# Patient Record
Sex: FEMALE | Race: WHITE | Employment: FULL TIME
[De-identification: names, ages, dates, MRNs, and addresses within clinical notes are randomized per-mention and may not be internally consistent; named-entity substitution may affect disease eponyms.]

---

## 2017-01-10 ENCOUNTER — OFFICE VISIT (OUTPATIENT)
Dept: OBGYN | Facility: CLINIC | Age: 27
End: 2017-01-10

## 2017-01-10 VITALS
HEIGHT: 68 IN | SYSTOLIC BLOOD PRESSURE: 128 MMHG | WEIGHT: 293 LBS | BODY MASS INDEX: 44.41 KG/M2 | DIASTOLIC BLOOD PRESSURE: 80 MMHG

## 2017-01-10 DIAGNOSIS — Z01.419 ENCOUNTER FOR WELL WOMAN EXAM WITH ROUTINE GYNECOLOGICAL EXAM: Primary | ICD-10-CM

## 2017-01-10 DIAGNOSIS — N92.6 IRREGULAR MENSTRUAL CYCLE: ICD-10-CM

## 2017-01-10 PROCEDURE — 99395 PREV VISIT EST AGE 18-39: CPT | Performed by: OBSTETRICS & GYNECOLOGY

## 2017-01-10 RX ORDER — NORGESTIMATE AND ETHINYL ESTRADIOL 0.25-0.035
1 KIT ORAL DAILY
Qty: 28 TABLET | Refills: 12 | Status: SHIPPED | OUTPATIENT
Start: 2017-01-10 | End: 2018-01-22 | Stop reason: SDUPTHER

## 2017-09-26 ENCOUNTER — HOSPITAL ENCOUNTER (OUTPATIENT)
Dept: SLEEP CENTER | Age: 27
Discharge: HOME | End: 2017-09-26
Payer: COMMERCIAL

## 2017-09-26 PROCEDURE — 95811 POLYSOM 6/>YRS CPAP 4/> PARM: CPT

## 2017-09-26 ASSESSMENT — SLEEP AND FATIGUE QUESTIONNAIRES
HOW LIKELY ARE YOU TO NOD OFF OR FALL ASLEEP IN A CAR, WHILE STOPPED FOR A FEW MINUTES IN TRAFFIC: 0
ESS TOTAL SCORE: 9
NECK CIRCUMFERENCE (INCHES): 20.5
HOW LIKELY ARE YOU TO NOD OFF OR FALL ASLEEP WHILE WATCHING TV: 2
HOW LIKELY ARE YOU TO NOD OFF OR FALL ASLEEP WHEN YOU ARE A PASSENGER IN A CAR FOR AN HOUR WITHOUT A BREAK: 1
HOW LIKELY ARE YOU TO NOD OFF OR FALL ASLEEP WHILE SITTING INACTIVE IN A PUBLIC PLACE: 0
HOW LIKELY ARE YOU TO NOD OFF OR FALL ASLEEP WHILE LYING DOWN TO REST IN THE AFTERNOON WHEN CIRCUMSTANCES PERMIT: 1
HOW LIKELY ARE YOU TO NOD OFF OR FALL ASLEEP WHILE SITTING AND READING: 1
HOW LIKELY ARE YOU TO NOD OFF OR FALL ASLEEP WHILE SITTING AND TALKING TO SOMEONE: 2
HOW LIKELY ARE YOU TO NOD OFF OR FALL ASLEEP WHILE SITTING QUIETLY AFTER LUNCH WITHOUT ALCOHOL: 2

## 2018-01-22 DIAGNOSIS — N92.6 IRREGULAR MENSTRUAL CYCLE: ICD-10-CM

## 2018-01-22 RX ORDER — NORGESTIMATE AND ETHINYL ESTRADIOL 0.25-0.035
1 KIT ORAL DAILY
Qty: 28 TABLET | Refills: 0 | Status: SHIPPED | OUTPATIENT
Start: 2018-01-22 | End: 2018-02-14 | Stop reason: SDUPTHER

## 2018-02-14 ENCOUNTER — OFFICE VISIT (OUTPATIENT)
Dept: OBGYN | Age: 28
End: 2018-02-14
Payer: COMMERCIAL

## 2018-02-14 ENCOUNTER — HOSPITAL ENCOUNTER (OUTPATIENT)
Age: 28
Setting detail: SPECIMEN
Discharge: HOME | End: 2018-02-14
Payer: COMMERCIAL

## 2018-02-14 VITALS
HEIGHT: 68 IN | BODY MASS INDEX: 44.41 KG/M2 | DIASTOLIC BLOOD PRESSURE: 70 MMHG | WEIGHT: 293 LBS | SYSTOLIC BLOOD PRESSURE: 138 MMHG

## 2018-02-14 DIAGNOSIS — B35.6 TINEA CRURIS: ICD-10-CM

## 2018-02-14 DIAGNOSIS — Z01.419 WOMEN'S ANNUAL ROUTINE GYNECOLOGICAL EXAMINATION: Primary | ICD-10-CM

## 2018-02-14 DIAGNOSIS — N92.6 IRREGULAR MENSTRUAL CYCLE: ICD-10-CM

## 2018-02-14 DIAGNOSIS — Z01.419 WOMEN'S ANNUAL ROUTINE GYNECOLOGICAL EXAMINATION: ICD-10-CM

## 2018-02-14 PROCEDURE — G0145 SCR C/V CYTO,THINLAYER,RESCR: HCPCS

## 2018-02-14 PROCEDURE — 99395 PREV VISIT EST AGE 18-39: CPT | Performed by: OBSTETRICS & GYNECOLOGY

## 2018-02-14 RX ORDER — NYSTATIN 100000 [USP'U]/G
POWDER TOPICAL
Qty: 45 G | Refills: 3 | Status: ON HOLD | OUTPATIENT
Start: 2018-02-14 | End: 2021-10-02

## 2018-02-14 RX ORDER — NORGESTIMATE AND ETHINYL ESTRADIOL 0.25-0.035
1 KIT ORAL DAILY
Qty: 28 TABLET | Refills: 12 | Status: SHIPPED | OUTPATIENT
Start: 2018-02-14 | End: 2019-02-11 | Stop reason: SDUPTHER

## 2018-02-14 NOTE — PROGRESS NOTES
capsules by mouth daily, Disp: 60 capsule, Rfl: 0    History   Sexual Activity    Sexual activity: Yes    Partners: Male       Any bleeding or pain with intercourse: No    Last Yearly:  1-    Last pap: 1-    Last HPV: never    Last Mammogram: never    Last Dexascan never    Do you do self breast exams: Yes    Past Medical History:   Diagnosis Date    Anemia 2005    Depression 2016    GERD (gastroesophageal reflux disease)     Herpes simplex type 2 (HSV-2) infection affecting pregnancy, antepartum     Hypertension 2016    Hypothyroidism 1995    MVP (mitral valve prolapse)        Past Surgical History:   Procedure Laterality Date    WISDOM TOOTH EXTRACTION         Family History   Problem Relation Age of Onset    High Blood Pressure Father     Heart Disease Father     Emphysema Father     COPD Father     Elevated Lipids Father     Atrial Fibrillation Father     Diabetes Maternal Grandfather     Heart Disease Paternal Grandfather     Diabetes Paternal Grandfather     High Blood Pressure Paternal Grandfather     Emphysema Paternal Grandfather     COPD Paternal Grandfather     High Blood Pressure Brother     Heart Disease Paternal Grandmother     Thyroid Disease Mother        Any family history of breast or ovarian cancer: No    Any family history of blood clots: No      Chief Complaint   Patient presents with    Gynecologic Exam          Nurse: MARC    PE:  Vital Signs  Blood pressure 138/70, height 5' 8\" (1.727 m), weight (!) 500 lb (226.8 kg), last menstrual period 01/22/2018, not currently breastfeeding. Labs:    No results found for this visit on 02/14/18. HPI: The patient is here today for a yearly exam.  She does request a refill of her birth control pills as well.     NoPT denies fever, chills, nausea and vomiting       Objective  Lymphatic:   no lymphadenopathy  Heent:   negative   Cor: regular rate and rhythm, no murmurs              Pul:clear to auscultation bilaterally- no wheezes, rales or rhonchi, normal air movement, no respiratory distress      GI: Abdomen soft, non-tender. BS normal. No masses,  No organomegaly, morbid obesity noted           Extremities: normal strength, tone, and muscle mass   Breasts: Breast:normal appearance, no masses or tenderness   Pelvic Exam: GENITAL/URINARY:  External Genitalia:  General appearance; normal, Hair distribution; normal, Lesions absent  Vagina:  General appearance normal, Estrogen effect normal, Discharge absent, Lesions absent, Pelvic support normal  Cervix:  General appearance normal, Lesions absent, Discharge absent, Tenderness absent, Enlargement absent, Nodularity absent, poorly visualized  Uterus:  Masses absent, Consistency; normal, Support normal, Tenderness absent, suboptimal exam  Adenexa:  Unable to palpate suboptimal exam                                    Vaginal discharge: no vaginal discharge   Skin: Patient does have tinea cruris in her abdominal folds. She was also counseled on her preventative health maintenance recommendations and follow-up. Assessment and Plan: I did talk to the patient about bariatric surgery. She is getting more interesting in proceeding with this. 1. Women's annual routine gynecological examination  PAP SMEAR   2. Irregular menstrual cycle  norgestimate-ethinyl estradiol (SPRINTEC 28) 0.25-35 MG-MCG per tablet   3. Tinea cruris  nystatin (MYCOSTATIN) 606632 UNIT/GM powder             I am having Ms. Razo start on nystatin. I am also having her maintain her Esomeprazole Magnesium (NEXIUM PO), atorvastatin, Vitamin D3, therapeutic multivitamin-minerals, losartan-hydrochlorothiazide, and norgestimate-ethinyl estradiol. Return in about 1 year (around 2/14/2019). There are no Patient Instructions on file for this visit.        Trung Engle,2/14/2018 3:23 PM

## 2018-02-22 LAB — CYTOLOGY REPORT: NORMAL

## 2018-08-04 ENCOUNTER — HOSPITAL ENCOUNTER (EMERGENCY)
Age: 28
Discharge: HOME | End: 2018-08-04
Payer: COMMERCIAL

## 2018-08-04 ENCOUNTER — APPOINTMENT (OUTPATIENT)
Dept: GENERAL RADIOLOGY | Age: 28
End: 2018-08-04
Payer: COMMERCIAL

## 2018-08-04 ENCOUNTER — APPOINTMENT (OUTPATIENT)
Dept: VASCULAR LAB | Age: 28
End: 2018-08-04
Payer: COMMERCIAL

## 2018-08-04 VITALS
HEART RATE: 96 BPM | OXYGEN SATURATION: 98 % | TEMPERATURE: 98.4 F | DIASTOLIC BLOOD PRESSURE: 86 MMHG | SYSTOLIC BLOOD PRESSURE: 154 MMHG | RESPIRATION RATE: 20 BRPM

## 2018-08-04 DIAGNOSIS — L03.116 CELLULITIS OF LEFT LOWER EXTREMITY: Primary | ICD-10-CM

## 2018-08-04 PROCEDURE — 93971 EXTREMITY STUDY: CPT

## 2018-08-04 PROCEDURE — 99283 EMERGENCY DEPT VISIT LOW MDM: CPT

## 2018-08-04 RX ORDER — GABAPENTIN 300 MG/1
300 CAPSULE ORAL ONCE
Status: DISCONTINUED | OUTPATIENT
Start: 2018-08-04 | End: 2018-08-04

## 2018-08-04 RX ORDER — IBUPROFEN 600 MG/1
600 TABLET ORAL EVERY 6 HOURS PRN
Qty: 30 TABLET | Refills: 0 | Status: ON HOLD | OUTPATIENT
Start: 2018-08-04 | End: 2021-10-02

## 2018-08-04 RX ORDER — DEXAMETHASONE SODIUM PHOSPHATE 10 MG/ML
10 INJECTION, SOLUTION INTRAMUSCULAR; INTRAVENOUS ONCE
Status: DISCONTINUED | OUTPATIENT
Start: 2018-08-04 | End: 2018-08-04

## 2018-08-04 RX ORDER — DOXYCYCLINE HYCLATE 100 MG
100 TABLET ORAL 2 TIMES DAILY
Qty: 20 TABLET | Refills: 0 | Status: SHIPPED | OUTPATIENT
Start: 2018-08-04 | End: 2018-08-14

## 2018-08-04 RX ORDER — OXYCODONE HYDROCHLORIDE AND ACETAMINOPHEN 5; 325 MG/1; MG/1
1 TABLET ORAL ONCE
Status: DISCONTINUED | OUTPATIENT
Start: 2018-08-04 | End: 2018-08-04

## 2018-08-04 RX ORDER — ESOMEPRAZOLE MAGNESIUM 40 MG/1
40 FOR SUSPENSION ORAL DAILY
Status: ON HOLD | COMMUNITY
End: 2021-10-02

## 2018-08-04 ASSESSMENT — PAIN DESCRIPTION - LOCATION: LOCATION: LEG

## 2018-08-04 ASSESSMENT — PAIN SCALES - GENERAL: PAINLEVEL_OUTOF10: 3

## 2018-08-04 ASSESSMENT — ENCOUNTER SYMPTOMS: SHORTNESS OF BREATH: 0

## 2018-08-04 ASSESSMENT — PAIN DESCRIPTION - PAIN TYPE: TYPE: ACUTE PAIN

## 2018-08-04 ASSESSMENT — PAIN DESCRIPTION - ORIENTATION: ORIENTATION: LEFT

## 2018-08-04 ASSESSMENT — PAIN DESCRIPTION - DESCRIPTORS: DESCRIPTORS: SHARP

## 2018-08-04 NOTE — ED PROVIDER NOTES
[facility] ED  eMERGENCY dEPARTMENT eNCOUnter      Pt Name: Liz Black  MRN: [id number]  [place] 1990  Date of evaluation: 8/4/2018  Provider: Cheo Dukes PA-C,    [address] [place] [address]       Chief Complaint   Patient presents with    Leg Pain     left leg touching air blowing on it hurts. red in color       HISTORY OF PRESENT ILLNESS    Liz Black is a 33 y.o. female who presents to the emergency department from home complains of pain anterior lower left leg for the past week or 2 and increased over the past several days. He states is painful to palpation. Patient states that she does of her leg a lot of work. She denies any shortness of breath any chest pain numbness paresthesias decreased range of motion or any other complaints at present. Triage notes and Nursing notes were reviewed by myself. Any discrepancies are addressed above. PAST MEDICAL HISTORY     Past Medical History:   Diagnosis Date    Anemia 2005    Depression 2016    GERD (gastroesophageal reflux disease)     Herpes simplex type 2 (HSV-2) infection affecting pregnancy, antepartum     Hyperlipidemia     Hypertension 2016    Hypothyroidism 1995    MVP (mitral valve prolapse)        SURGICAL HISTORY       Past Surgical History:   Procedure Laterality Date    WISDOM TOOTH EXTRACTION         CURRENT MEDICATIONS       Previous Medications    ATORVASTATIN (LIPITOR) 20 MG TABLET    Take 1 tablet by mouth daily    CHOLECALCIFEROL (VITAMIN D3) 1000 UNITS CAPS    Take 2 capsules by mouth daily    ESOMEPRAZOLE MAGNESIUM (NEXIUM) 40 MG PACK    Take 40 mg by mouth daily    IBUPROFEN (IBU) 800 MG TABLET    Take 1 tablet by mouth every 8 hours as needed for Pain for 10 days.     LOSARTAN-HYDROCHLOROTHIAZIDE (HYZAAR) 50-12.5 MG PER TABLET    Take 1 tablet by mouth daily    MULTIPLE VITAMINS-MINERALS (THERAPEUTIC MULTIVITAMIN-MINERALS) TABLET    Take 1 tablet by mouth daily    NORGESTIMATE-ETHINYL ESTRADIOL (SPRINTEC 28) 0.25-35 MG-MCG PER TABLET    Take 1 tablet by mouth daily    NYSTATIN (MYCOSTATIN) 184036 UNIT/GM POWDER    Apply 3 times daily as needed for skin infection       ALLERGIES     Penicillins and Tape [adhesive tape]    FAMILY HISTORY       Family History   Problem Relation Age of Onset    High Blood Pressure Father     Heart Disease Father     Emphysema Father     COPD Father     Elevated Lipids Father     Atrial Fibrillation Father     Diabetes Maternal Grandfather     Heart Disease Paternal Grandfather     Diabetes Paternal Grandfather     High Blood Pressure Paternal Grandfather     Emphysema Paternal Grandfather     COPD Paternal Grandfather     High Blood Pressure Brother     Heart Disease Paternal Grandmother     Thyroid Disease Mother         SOCIAL HISTORY       Social History     Social History    Marital status:      Spouse name: N/A    Number of children: N/A    Years of education: N/A     Social History Main Topics    Smoking status: Former Smoker     Types: Cigarettes     Quit date: 9/14/2014    Smokeless tobacco: Never Used    Alcohol use Yes      Comment: rare    Drug use: No    Sexual activity: Yes     Partners: Male     Other Topics Concern    None     Social History Narrative    None       REVIEW OF SYSTEMS       Review of Systems   Respiratory: Negative for shortness of breath. Musculoskeletal: Positive for myalgias. Neurological: Negative for numbness. All other systems reviewed and are negative. Review of systems as in HPI & PMH otherwise negative    PHYSICAL EXAM    (up to 7 for level 4, 8 or more for level 5)     ED Triage Vitals [08/04/18 1042]   BP Temp Temp Source Pulse Resp SpO2 Height Weight   -- 98.4 °F (36.9 °C) Tympanic 115 20 98 % -- --       Physical Exam   Constitutional: She is oriented to person, place, and time. She appears well-developed and well-nourished. HENT:   Head: Normocephalic and atraumatic.    Eyes: Conjunctivae are normal.

## 2018-08-04 NOTE — LETTER
[facility] ED  [address] [address]  Phone: [phone]  Fax: [phone]             August 4, 2018    Patient: Jimena Varela   YOB: 1990   Date of Visit: 8/4/2018       To Whom It May Concern:    Stu Luna was seen and treated in our emergency department on 8/4/2018. She may return to work on 8/6/2018.       Sincerely,             Signature:__________________________________

## 2019-02-11 DIAGNOSIS — N92.6 IRREGULAR MENSTRUAL CYCLE: ICD-10-CM

## 2019-02-11 RX ORDER — NORGESTIMATE AND ETHINYL ESTRADIOL 0.25-0.035
1 KIT ORAL DAILY
Qty: 28 TABLET | Refills: 0 | Status: ON HOLD | OUTPATIENT
Start: 2019-02-11 | End: 2021-10-02

## 2020-01-12 ENCOUNTER — HOSPITAL ENCOUNTER (EMERGENCY)
Age: 30
Discharge: HOME | End: 2020-01-12
Attending: EMERGENCY MEDICINE

## 2020-01-12 ENCOUNTER — APPOINTMENT (OUTPATIENT)
Dept: CT IMAGING | Age: 30
End: 2020-01-12

## 2020-01-12 ENCOUNTER — APPOINTMENT (OUTPATIENT)
Dept: GENERAL RADIOLOGY | Age: 30
End: 2020-01-12

## 2020-01-12 VITALS
TEMPERATURE: 96.5 F | HEIGHT: 68 IN | RESPIRATION RATE: 20 BRPM | BODY MASS INDEX: 44.41 KG/M2 | OXYGEN SATURATION: 98 % | SYSTOLIC BLOOD PRESSURE: 147 MMHG | WEIGHT: 293 LBS | HEART RATE: 100 BPM | DIASTOLIC BLOOD PRESSURE: 99 MMHG

## 2020-01-12 LAB
-: ABNORMAL
ABSOLUTE EOS #: 0.26 K/UL (ref 0–0.44)
ABSOLUTE IMMATURE GRANULOCYTE: 0.05 K/UL (ref 0–0.3)
ABSOLUTE LYMPH #: 2.11 K/UL (ref 1.1–3.7)
ABSOLUTE MONO #: 0.52 K/UL (ref 0.1–1.2)
ALBUMIN SERPL-MCNC: 4.1 G/DL (ref 3.5–5.2)
ALBUMIN/GLOBULIN RATIO: 1.1 (ref 1–2.5)
ALP BLD-CCNC: 89 U/L (ref 35–104)
ALT SERPL-CCNC: 13 U/L (ref 5–33)
AMORPHOUS: ABNORMAL
ANION GAP SERPL CALCULATED.3IONS-SCNC: 17 MMOL/L (ref 9–17)
AST SERPL-CCNC: 20 U/L
BACTERIA: ABNORMAL
BASOPHILS # BLD: 1 % (ref 0–2)
BASOPHILS ABSOLUTE: 0.07 K/UL (ref 0–0.2)
BILIRUB SERPL-MCNC: 0.24 MG/DL (ref 0.3–1.2)
BILIRUBIN URINE: NEGATIVE
BUN BLDV-MCNC: 12 MG/DL (ref 6–20)
BUN/CREAT BLD: 16 (ref 9–20)
CALCIUM SERPL-MCNC: 9.4 MG/DL (ref 8.6–10.4)
CASTS UA: ABNORMAL /LPF
CHLORIDE BLD-SCNC: 99 MMOL/L (ref 98–107)
CO2: 22 MMOL/L (ref 20–31)
COLOR: YELLOW
COMMENT UA: ABNORMAL
CREAT SERPL-MCNC: 0.75 MG/DL (ref 0.5–0.9)
CRYSTALS, UA: ABNORMAL /HPF
DIFFERENTIAL TYPE: ABNORMAL
EOSINOPHILS RELATIVE PERCENT: 2 % (ref 1–4)
EPITHELIAL CELLS UA: ABNORMAL /HPF (ref 0–25)
GFR AFRICAN AMERICAN: >60 ML/MIN
GFR NON-AFRICAN AMERICAN: >60 ML/MIN
GFR SERPL CREATININE-BSD FRML MDRD: ABNORMAL ML/MIN/{1.73_M2}
GFR SERPL CREATININE-BSD FRML MDRD: ABNORMAL ML/MIN/{1.73_M2}
GLUCOSE BLD-MCNC: 119 MG/DL (ref 70–99)
GLUCOSE URINE: NEGATIVE
HCG QUALITATIVE: NEGATIVE
HCT VFR BLD CALC: 38.7 % (ref 36.3–47.1)
HEMOGLOBIN: 11.7 G/DL (ref 11.9–15.1)
IMMATURE GRANULOCYTES: 0 %
KETONES, URINE: NEGATIVE
LACTIC ACID, SEPSIS WHOLE BLOOD: NORMAL MMOL/L (ref 0.5–1.9)
LACTIC ACID, SEPSIS: 1.7 MMOL/L (ref 0.5–1.9)
LEUKOCYTE ESTERASE, URINE: NEGATIVE
LYMPHOCYTES # BLD: 16 % (ref 24–43)
MCH RBC QN AUTO: 25.9 PG (ref 25.2–33.5)
MCHC RBC AUTO-ENTMCNC: 30.2 G/DL (ref 28.4–34.8)
MCV RBC AUTO: 85.6 FL (ref 82.6–102.9)
MONOCYTES # BLD: 4 % (ref 3–12)
MUCUS: ABNORMAL
NITRITE, URINE: NEGATIVE
NRBC AUTOMATED: 0 PER 100 WBC
OTHER OBSERVATIONS UA: ABNORMAL
PARTIAL THROMBOPLASTIN TIME: 29.4 SEC (ref 23.2–34.4)
PDW BLD-RTO: 14.4 % (ref 11.8–14.4)
PH UA: 5.5 (ref 5–9)
PLATELET # BLD: 373 K/UL (ref 138–453)
PLATELET ESTIMATE: ABNORMAL
PMV BLD AUTO: 10.3 FL (ref 8.1–13.5)
POTASSIUM SERPL-SCNC: 4.1 MMOL/L (ref 3.7–5.3)
PROTEIN UA: ABNORMAL
RBC # BLD: 4.52 M/UL (ref 3.95–5.11)
RBC # BLD: ABNORMAL 10*6/UL
RBC UA: ABNORMAL /HPF (ref 0–2)
RENAL EPITHELIAL, UA: ABNORMAL /HPF
SEG NEUTROPHILS: 77 % (ref 36–65)
SEGMENTED NEUTROPHILS ABSOLUTE COUNT: 10.08 K/UL (ref 1.5–8.1)
SODIUM BLD-SCNC: 138 MMOL/L (ref 135–144)
SPECIFIC GRAVITY UA: >1.03 (ref 1.01–1.02)
TOTAL PROTEIN: 7.7 G/DL (ref 6.4–8.3)
TRICHOMONAS: ABNORMAL
TURBIDITY: ABNORMAL
URINE HGB: ABNORMAL
UROBILINOGEN, URINE: NORMAL
WBC # BLD: 13.1 K/UL (ref 3.5–11.3)
WBC # BLD: ABNORMAL 10*3/UL
WBC UA: ABNORMAL /HPF (ref 0–5)
YEAST: ABNORMAL

## 2020-01-12 PROCEDURE — 96375 TX/PRO/DX INJ NEW DRUG ADDON: CPT

## 2020-01-12 PROCEDURE — 81001 URINALYSIS AUTO W/SCOPE: CPT

## 2020-01-12 PROCEDURE — 85025 COMPLETE CBC W/AUTO DIFF WBC: CPT

## 2020-01-12 PROCEDURE — 87088 URINE BACTERIA CULTURE: CPT

## 2020-01-12 PROCEDURE — 87086 URINE CULTURE/COLONY COUNT: CPT

## 2020-01-12 PROCEDURE — 96374 THER/PROPH/DIAG INJ IV PUSH: CPT

## 2020-01-12 PROCEDURE — 87077 CULTURE AEROBIC IDENTIFY: CPT

## 2020-01-12 PROCEDURE — 99284 EMERGENCY DEPT VISIT MOD MDM: CPT

## 2020-01-12 PROCEDURE — 71046 X-RAY EXAM CHEST 2 VIEWS: CPT

## 2020-01-12 PROCEDURE — 85730 THROMBOPLASTIN TIME PARTIAL: CPT

## 2020-01-12 PROCEDURE — 87040 BLOOD CULTURE FOR BACTERIA: CPT

## 2020-01-12 PROCEDURE — 87205 SMEAR GRAM STAIN: CPT

## 2020-01-12 PROCEDURE — 6370000000 HC RX 637 (ALT 250 FOR IP): Performed by: EMERGENCY MEDICINE

## 2020-01-12 PROCEDURE — 83605 ASSAY OF LACTIC ACID: CPT

## 2020-01-12 PROCEDURE — 80053 COMPREHEN METABOLIC PANEL: CPT

## 2020-01-12 PROCEDURE — 6360000002 HC RX W HCPCS: Performed by: EMERGENCY MEDICINE

## 2020-01-12 PROCEDURE — 87186 SC STD MICRODIL/AGAR DIL: CPT

## 2020-01-12 PROCEDURE — 36415 COLL VENOUS BLD VENIPUNCTURE: CPT

## 2020-01-12 PROCEDURE — 84703 CHORIONIC GONADOTROPIN ASSAY: CPT

## 2020-01-12 RX ORDER — HYDROCODONE BITARTRATE AND ACETAMINOPHEN 5; 325 MG/1; MG/1
1 TABLET ORAL EVERY 6 HOURS PRN
Qty: 10 TABLET | Refills: 0 | Status: SHIPPED | OUTPATIENT
Start: 2020-01-12 | End: 2020-01-15

## 2020-01-12 RX ORDER — ONDANSETRON 2 MG/ML
4 INJECTION INTRAMUSCULAR; INTRAVENOUS ONCE
Status: COMPLETED | OUTPATIENT
Start: 2020-01-12 | End: 2020-01-12

## 2020-01-12 RX ORDER — LEVOFLOXACIN 500 MG/1
500 TABLET, FILM COATED ORAL DAILY
Qty: 10 TABLET | Refills: 0 | Status: SHIPPED | OUTPATIENT
Start: 2020-01-12 | End: 2020-01-22

## 2020-01-12 RX ORDER — LEVOFLOXACIN 500 MG/1
500 TABLET, FILM COATED ORAL ONCE
Status: COMPLETED | OUTPATIENT
Start: 2020-01-12 | End: 2020-01-12

## 2020-01-12 RX ORDER — ONDANSETRON 4 MG/1
4 TABLET, ORALLY DISINTEGRATING ORAL EVERY 8 HOURS PRN
Qty: 20 TABLET | Refills: 0 | Status: ON HOLD | OUTPATIENT
Start: 2020-01-12 | End: 2021-10-02 | Stop reason: ALTCHOICE

## 2020-01-12 RX ORDER — KETOROLAC TROMETHAMINE 15 MG/ML
15 INJECTION, SOLUTION INTRAMUSCULAR; INTRAVENOUS ONCE
Status: COMPLETED | OUTPATIENT
Start: 2020-01-12 | End: 2020-01-12

## 2020-01-12 RX ADMIN — LEVOFLOXACIN 500 MG: 500 TABLET, FILM COATED ORAL at 21:06

## 2020-01-12 RX ADMIN — ONDANSETRON 4 MG: 2 INJECTION INTRAMUSCULAR; INTRAVENOUS at 18:15

## 2020-01-12 RX ADMIN — KETOROLAC TROMETHAMINE 15 MG: 15 INJECTION, SOLUTION INTRAMUSCULAR; INTRAVENOUS at 19:02

## 2020-01-12 ASSESSMENT — ENCOUNTER SYMPTOMS
ABDOMINAL PAIN: 0
PHOTOPHOBIA: 0
NAUSEA: 0
CHEST TIGHTNESS: 0
WHEEZING: 0
VOMITING: 0
BLOOD IN STOOL: 0
FACIAL SWELLING: 0
BACK PAIN: 0
VOICE CHANGE: 0
SORE THROAT: 0
COUGH: 0
DIARRHEA: 0
TROUBLE SWALLOWING: 0
SHORTNESS OF BREATH: 0

## 2020-01-12 ASSESSMENT — PAIN DESCRIPTION - PAIN TYPE: TYPE: ACUTE PAIN

## 2020-01-12 ASSESSMENT — PAIN SCALES - GENERAL
PAINLEVEL_OUTOF10: 10
PAINLEVEL_OUTOF10: 9

## 2020-01-12 ASSESSMENT — PAIN DESCRIPTION - ORIENTATION: ORIENTATION: LEFT

## 2020-01-12 ASSESSMENT — PAIN DESCRIPTION - LOCATION: LOCATION: FLANK

## 2020-01-12 NOTE — LETTER
[facility] ED  [address] [place] [address] [place] [address]  Phone: [phone]  Fax: [phone]             January 12, 2020    Patient: Manda Rowe   YOB: 1990   Date of Visit: 1/12/2020       To Whom It May Concern:    Abilio Sebastian was seen and treated in our emergency department on 1/12/2020. She may return to work on 01/16/2020.       Sincerely,             Signature:__________________________________
[employer]

## 2020-01-12 NOTE — ED PROVIDER NOTES
[facility] ED    EMERGENCY MEDICINE     Pt Name: Elena Torres  MRN: [id number]  [place] 1990  Date of evaluation: 1/12/2020  Provider: Eneida Dow DO, [address]       Chief Complaint   Patient presents with    Flank Pain     Left, onset 4hrs ago. HISTORY OF PRESENT ILLNESS    Elena Torres is a 26 y.o. female who presents to the emergency department from home with complaints of left flank pain that started 4 to 5 hours ago, several episodes of vomiting, chills, and overall not feeling well. She has had a cough which is been nonproductive for the last 2 days, but the other symptoms started just a few hours ago. She denies any hematuria or dysuria, but has had a lot of frequency of urination. She denies any dark or bloody stools, denies any other associated symptoms. Triage notes and Nursing notes were reviewed by myself. Any discrepancies are addressed above. PAST MEDICAL HISTORY     Past Medical History:   Diagnosis Date    Anemia 2005    Depression 2016    GERD (gastroesophageal reflux disease)     Herpes simplex type 2 (HSV-2) infection affecting pregnancy, antepartum     Hyperlipidemia     Hypertension 2016    Hypothyroidism 1995    MVP (mitral valve prolapse)        SURGICAL HISTORY       Past Surgical History:   Procedure Laterality Date    WISDOM TOOTH EXTRACTION         CURRENT MEDICATIONS       Previous Medications    ATORVASTATIN (LIPITOR) 20 MG TABLET    Take 1 tablet by mouth daily    CHOLECALCIFEROL (VITAMIN D3) 1000 UNITS CAPS    Take 2 capsules by mouth daily    ESOMEPRAZOLE MAGNESIUM (NEXIUM) 40 MG PACK    Take 40 mg by mouth daily    IBUPROFEN (IBU) 600 MG TABLET    Take 1 tablet by mouth every 6 hours as needed for Pain    IBUPROFEN (IBU) 800 MG TABLET    Take 1 tablet by mouth every 8 hours as needed for Pain for 10 days.     LOSARTAN-HYDROCHLOROTHIAZIDE (HYZAAR) 50-12.5 MG PER TABLET    Take 1 tablet by mouth daily note:    XR CHEST STANDARD (2 VW)    (Results Pending)       LABS:  Labs Reviewed   URINE CULTURE   CULTURE BLOOD #1   CULTURE BLOOD #2   CBC WITH AUTO DIFFERENTIAL   COMPREHENSIVE METABOLIC PANEL W/ REFLEX TO MG FOR LOW K   URINALYSIS   LACTATE, SEPSIS   LACTATE, SEPSIS   APTT   PROTIME-INR   HCG, SERUM, QUALITATIVE       All other labs were within normal range or not returned as of this dictation. Please note, any cultures that may have been sent were not resulted at the time of this patient visit. EMERGENCY DEPARTMENT COURSE andMedical Decision Making:     MDM/  ED Course as of Jan 12 2308   Sun Jan 12, 2020 1911 Signout to Dr Luque Done at 1900hrs shift change    [AB]      ED Course User Index  [AB] Charly Ramos,      Workup initiated. Case is signed out to Dr Luque Done at 1900hr shift change    ED Medications administered this visit:  Medications - No data to display      Procedures: (None if blank)       CLINICAL     No diagnosis found. DISPOSITION/PLAN   DISPOSITION        PATIENT REFERRED TO:  No follow-up provider specified.     DISCHARGE MEDICATIONS:  New Prescriptions    No medications on file              (Please note that portions of this note were completed with a voice recognition program.  Efforts were made to edit the dictations but occasionallywords are mis-transcribed.)      Lucrecia Beltran DO,CAMILA (electronically signed)  Attending Physician, Emergency [address], DO  01/12/20 9161

## 2020-01-12 NOTE — ED NOTES
Dr. Russell Andrade made aware that a clean catch urine was obtained. He said to run it and may need to straight cath.       Miko Anguiano RN  01/12/20 5831

## 2020-01-14 LAB
CULTURE: ABNORMAL
Lab: ABNORMAL
Lab: ABNORMAL
SPECIMEN DESCRIPTION: ABNORMAL
SPECIMEN DESCRIPTION: ABNORMAL

## 2020-07-11 ENCOUNTER — HOSPITAL ENCOUNTER (EMERGENCY)
Age: 30
Discharge: HOME | End: 2020-07-11
Attending: EMERGENCY MEDICINE
Payer: OTHER GOVERNMENT

## 2020-07-11 VITALS
TEMPERATURE: 97.8 F | HEART RATE: 80 BPM | WEIGHT: 293 LBS | HEIGHT: 68 IN | BODY MASS INDEX: 44.41 KG/M2 | SYSTOLIC BLOOD PRESSURE: 202 MMHG | DIASTOLIC BLOOD PRESSURE: 74 MMHG | OXYGEN SATURATION: 97 % | RESPIRATION RATE: 20 BRPM

## 2020-07-11 PROCEDURE — U0003 INFECTIOUS AGENT DETECTION BY NUCLEIC ACID (DNA OR RNA); SEVERE ACUTE RESPIRATORY SYNDROME CORONAVIRUS 2 (SARS-COV-2) (CORONAVIRUS DISEASE [COVID-19]), AMPLIFIED PROBE TECHNIQUE, MAKING USE OF HIGH THROUGHPUT TECHNOLOGIES AS DESCRIBED BY CMS-2020-01-R: HCPCS

## 2020-07-11 PROCEDURE — 99283 EMERGENCY DEPT VISIT LOW MDM: CPT

## 2020-07-11 ASSESSMENT — ENCOUNTER SYMPTOMS
COUGH: 1
CHEST TIGHTNESS: 0
SHORTNESS OF BREATH: 0
DIARRHEA: 1
ABDOMINAL PAIN: 0
NAUSEA: 1
VOICE CHANGE: 0
BLOOD IN STOOL: 0
BACK PAIN: 0
TROUBLE SWALLOWING: 0
VOMITING: 1

## 2020-07-11 NOTE — LETTER
[facility] ED  [address] [place] [address] [place] [address]  Phone: [phone]  Fax: [phone]             July 11, 2020    Patient: Ermelinda Tejeda   YOB: 1990   Date of Visit: 7/11/2020       To Whom It May Concern:    Rosy Buckley was seen and treated in our emergency department on 7/11/2020. She may return to work on 07/15/2020.       Sincerely,             Signature:__________________________________

## 2020-07-11 NOTE — ED PROVIDER NOTES
[facility] ED    EMERGENCY MEDICINE     Pt Name: Jeremy Cuevas  MRN: [id number]  [place] 1990  Date of evaluation: 7/11/2020  Provider: Francesco Remy DO, [address]       Chief Complaint   Patient presents with    Headache     ongoing for 2-3 weeks    Diarrhea     intermittent over past 2-3 weeks    Nausea     intermittent over past 2-3 weeks       HISTORY OF PRESENT ILLNESS    Jeremy Cuevas is a 31 y.o. female who presents to the emergency department from home she wants to be tested for COVID. The patient states that over the course the last 2 to 3 weeks she has had some intermittent headaches, intermittent nausea, intermittent vomiting, intermittent diarrhea. The symptoms are very scattered, not consistent or constant. She denies any fevers or chills. She denies any dysuria hematuria. She has had a mild nonproductive cough. She denies any confirmed infectious contacts, she states she works at [employer] and states there is a person under investigation there. Triage notes and Nursing notes were reviewed by myself. Any discrepancies are addressed above.     PAST MEDICAL HISTORY     Past Medical History:   Diagnosis Date    Anemia 2005    Depression 2016    GERD (gastroesophageal reflux disease)     Herpes simplex type 2 (HSV-2) infection affecting pregnancy, antepartum     Hyperlipidemia     Hypertension 2016    Hypothyroidism 1995    MVP (mitral valve prolapse)        SURGICAL HISTORY       Past Surgical History:   Procedure Laterality Date    WISDOM TOOTH EXTRACTION         CURRENT MEDICATIONS       Previous Medications    ATORVASTATIN (LIPITOR) 20 MG TABLET    Take 1 tablet by mouth daily    CHOLECALCIFEROL (VITAMIN D3) 1000 UNITS CAPS    Take 2 capsules by mouth daily    ESOMEPRAZOLE MAGNESIUM (NEXIUM) 40 MG PACK    Take 40 mg by mouth daily    IBUPROFEN (IBU) 600 MG TABLET    Take 1 tablet by mouth every 6 hours as needed for Pain    IBUPROFEN (IBU) 800 MG TABLET    Take 1 tablet by mouth every 8 hours as needed for Pain for 10 days.     LOSARTAN-HYDROCHLOROTHIAZIDE (HYZAAR) 50-12.5 MG PER TABLET    Take 1 tablet by mouth daily    MULTIPLE VITAMINS-MINERALS (THERAPEUTIC MULTIVITAMIN-MINERALS) TABLET    Take 1 tablet by mouth daily    NORGESTIMATE-ETHINYL ESTRADIOL (SPRINTEC 28) 0.25-35 MG-MCG PER TABLET    Take 1 tablet by mouth daily    NYSTATIN (MYCOSTATIN) 501363 UNIT/GM POWDER    Apply 3 times daily as needed for skin infection    ONDANSETRON (ZOFRAN ODT) 4 MG DISINTEGRATING TABLET    Take 1 tablet by mouth every 8 hours as needed for Nausea or Vomiting       ALLERGIES     Penicillins and Tape [adhesive tape]    FAMILY HISTORY       Family History   Problem Relation Age of Onset    High Blood Pressure Father     Heart Disease Father     Emphysema Father     COPD Father     Elevated Lipids Father     Atrial Fibrillation Father     Diabetes Maternal Grandfather     Heart Disease Paternal Grandfather     Diabetes Paternal Grandfather     High Blood Pressure Paternal Grandfather     Emphysema Paternal Grandfather     COPD Paternal Grandfather     High Blood Pressure Brother     Heart Disease Paternal Grandmother     Thyroid Disease Mother         SOCIAL HISTORY       Social History     Socioeconomic History    Marital status:      Spouse name: None    Number of children: None    Years of education: None    Highest education level: None   Occupational History    None   Social Needs    Financial resource strain: None    Food insecurity     Worry: None     Inability: None    Transportation needs     Medical: None     Non-medical: None   Tobacco Use    Smoking status: Former Smoker     Types: Cigarettes     Last attempt to quit: 2014     Years since quittin.8    Smokeless tobacco: Never Used   Substance and Sexual Activity    Alcohol use: Yes     Comment: rare    Drug use: No    Sexual activity: Yes     Partners: Male   Lifestyle    Physical activity     Days per week: None     Minutes per session: None    Stress: None   Relationships    Social connections     Talks on phone: None     Gets together: None     Attends Caodaism service: None     Active member of club or organization: None     Attends meetings of clubs or organizations: None     Relationship status: None    Intimate partner violence     Fear of current or ex partner: None     Emotionally abused: None     Physically abused: None     Forced sexual activity: None   Other Topics Concern    None   Social History Narrative    None       REVIEW OF SYSTEMS     Review of Systems   Constitutional: Negative for chills, diaphoresis and fever. HENT: Negative for trouble swallowing and voice change. Eyes: Negative for visual disturbance. Respiratory: Positive for cough. Negative for chest tightness and shortness of breath. Cardiovascular: Negative for chest pain and leg swelling. Gastrointestinal: Positive for diarrhea, nausea and vomiting. Negative for abdominal pain and blood in stool. Genitourinary: Negative for dysuria, frequency and hematuria. Musculoskeletal: Negative for back pain and neck pain. Skin: Negative for rash and wound. Neurological: Negative for speech difficulty, weakness, numbness and headaches. Psychiatric/Behavioral: Negative for confusion. Except as noted above the remainder of the review of systems was reviewed and is. PHYSICAL EXAM    (up to 7 for level 4, 8 or more for level 5)     ED Triage Vitals   BP Temp Temp Source Pulse Resp SpO2 Height Weight   07/11/20 0858 07/11/20 0857 07/11/20 0857 07/11/20 0900 07/11/20 0900 07/11/20 0900 07/11/20 0900 07/11/20 0900   (!) 213/88 97.8 °F (36.6 °C) Tympanic 100 20 97 % 5' 8\" (1.727 m) (!) 478 lb (216.8 kg)       Physical Exam  Vitals signs and nursing note reviewed. Constitutional:       General: She is not in acute distress. Appearance: She is well-developed.  She is not ill-appearing, toxic-appearing or diaphoretic. HENT:      Head: Normocephalic and atraumatic. Eyes:      General: No scleral icterus. Conjunctiva/sclera: Conjunctivae normal.      Right eye: Right conjunctiva is not injected. Left eye: Left conjunctiva is not injected. Pupils: Pupils are equal, round, and reactive to light. Neck:      Musculoskeletal: Normal range of motion and neck supple. Thyroid: No thyromegaly. Trachea: No tracheal deviation. Cardiovascular:      Rate and Rhythm: Normal rate and regular rhythm. Heart sounds: Normal heart sounds. No murmur. No friction rub. No gallop. Pulmonary:      Effort: Pulmonary effort is normal. No respiratory distress. Breath sounds: Normal breath sounds. No stridor. No wheezing or rales. Abdominal:      General: Bowel sounds are normal. There is no distension. Palpations: Abdomen is soft. There is no mass. Tenderness: There is no abdominal tenderness. There is no guarding or rebound. Comments: Negative Garibay's sign  Nontender McBurney's Point  Negative Rovsig's sign  No bruising or echymosis of abdomen  Morbidly obese   Musculoskeletal:         General: No tenderness. Comments: Negative Kvng's Sign bilaterally   Lymphadenopathy:      Cervical: No cervical adenopathy. Skin:     General: Skin is warm and dry. Coloration: Skin is not pale. Findings: No erythema or rash. Neurological:      Mental Status: She is alert and oriented to person, place, and time. Cranial Nerves: No cranial nerve deficit. Motor: No abnormal muscle tone. Coordination: Coordination normal.      Comments: No nystagmus   Psychiatric:         Behavior: Behavior normal.         Thought Content:  Thought content normal.         DIAGNOSTIC RESULTS     EKG:(none if blank)  All EKG's are interpreted by [facility] Phys[facility] who either signs or Co-signs this chart in the absence of a cardiologist.        RADIOLOGY: (none if blank)   Interpretation per the Radiologistbelow, if available at the time of this note:    No orders to display       LABS:  Labs Reviewed   COVID-19       All other labs were within normal range or not returned as of this dictation. Please note, any cultures that may have been sent were not resulted at the time of this patient visit. EMERGENCY DEPARTMENT COURSE andMedical Decision Making:     MDM/   Patient presents today for testing for COVID she is concerned about possibility of this. She is completely asymptomatic today. She is noted to be significantly hypertensive on arrival and needs follow-up for blood pressure management and evaluation. At this point, I feel that she stable for discharge home with follow-up she is advised to return if she develops any worsening symptoms including shortness of breath, severe headache, neck pain back pain chest pain shortness of breath or any other associated symptoms  Patient's blood pressure is elevated today but appears to not be causing her any symptoms at this time and therefore considered reasonable to discharge her with follow-up  Strict returnprecautions and follow up instructions were discussed with the patient with which the patient agrees    ED Medications administered this visit:  Medications - No data to display      Procedures: (None if blank)       CLINICAL       1. Nonintractable episodic headache, unspecified headache type    2. Diarrhea, unspecified type    3.  Encounter for laboratory testing for COVID-19 virus          DISPOSITION/PLAN   DISPOSITION Discharge - Pending Orders Complete 07/11/2020 09:14:03 AM      PATIENT REFERRED TO:  [place] [id number]  [phone]  In 3 days        DISCHARGE MEDICATIONS:  New Prescriptions    No medications on file              (Please note that portions of this note were completed with a voice recognition program.

## 2020-07-13 ENCOUNTER — CARE COORDINATION (OUTPATIENT)
Dept: CARE COORDINATION | Age: 30
End: 2020-07-13

## 2020-07-14 LAB
SARS-COV-2, PCR: NORMAL
SARS-COV-2, RAPID: NORMAL
SARS-COV-2: NOT DETECTED
SOURCE: NORMAL

## 2020-07-14 NOTE — CARE COORDINATION
Call to patient for COVID-19 monitoring due to  ED visit 7/11/20. Messages left requesting  Call back to [phone].

## 2021-01-07 ENCOUNTER — HOSPITAL ENCOUNTER (OUTPATIENT)
Dept: LAB | Age: 31
Setting detail: SPECIMEN
Discharge: HOME | End: 2021-01-07
Payer: COMMERCIAL

## 2021-01-07 PROCEDURE — U0005 INFEC AGEN DETEC AMPLI PROBE: HCPCS

## 2021-01-07 PROCEDURE — C9803 HOPD COVID-19 SPEC COLLECT: HCPCS

## 2021-01-07 PROCEDURE — U0003 INFECTIOUS AGENT DETECTION BY NUCLEIC ACID (DNA OR RNA); SEVERE ACUTE RESPIRATORY SYNDROME CORONAVIRUS 2 (SARS-COV-2) (CORONAVIRUS DISEASE [COVID-19]), AMPLIFIED PROBE TECHNIQUE, MAKING USE OF HIGH THROUGHPUT TECHNOLOGIES AS DESCRIBED BY CMS-2020-01-R: HCPCS

## 2021-01-08 LAB
SARS-COV-2, RAPID: NORMAL
SARS-COV-2: NORMAL
SARS-COV-2: NOT DETECTED
SOURCE: NORMAL

## 2021-10-02 ENCOUNTER — HOSPITAL ENCOUNTER (EMERGENCY)
Age: 31
Discharge: ANOTHER HOSPITAL | End: 2021-10-02
Attending: EMERGENCY MEDICINE
Payer: COMMERCIAL

## 2021-10-02 ENCOUNTER — APPOINTMENT (OUTPATIENT)
Dept: CT IMAGING | Age: 31
End: 2021-10-02
Payer: COMMERCIAL

## 2021-10-02 ENCOUNTER — HOSPITAL ENCOUNTER (INPATIENT)
Age: 31
LOS: 1 days | Discharge: HOME | DRG: 394 | End: 2021-10-03
Attending: EMERGENCY MEDICINE | Admitting: INTERNAL MEDICINE
Payer: COMMERCIAL

## 2021-10-02 VITALS
HEIGHT: 68 IN | BODY MASS INDEX: 44.41 KG/M2 | WEIGHT: 293 LBS | OXYGEN SATURATION: 100 % | RESPIRATION RATE: 22 BRPM | TEMPERATURE: 98.5 F | HEART RATE: 79 BPM | SYSTOLIC BLOOD PRESSURE: 149 MMHG | DIASTOLIC BLOOD PRESSURE: 74 MMHG

## 2021-10-02 DIAGNOSIS — K42.9 REDUCIBLE UMBILICAL HERNIA: Primary | ICD-10-CM

## 2021-10-02 DIAGNOSIS — K42.9 PERIUMBILICAL HERNIA: Primary | ICD-10-CM

## 2021-10-02 PROBLEM — N83.8 MASS OF LEFT OVARY: Status: ACTIVE | Noted: 2021-10-02

## 2021-10-02 PROBLEM — G47.30 SLEEP APNEA: Status: ACTIVE | Noted: 2021-10-02

## 2021-10-02 LAB
ABSOLUTE EOS #: 0.31 K/UL (ref 0–0.44)
ABSOLUTE IMMATURE GRANULOCYTE: <0.03 K/UL (ref 0–0.3)
ABSOLUTE LYMPH #: 3.13 K/UL (ref 1.1–3.7)
ABSOLUTE MONO #: 0.45 K/UL (ref 0.1–1.2)
ALBUMIN SERPL-MCNC: 4 G/DL (ref 3.5–5.2)
ALBUMIN/GLOBULIN RATIO: 1.4 (ref 1–2.5)
ALP BLD-CCNC: 84 U/L (ref 35–104)
ALT SERPL-CCNC: 43 U/L (ref 5–33)
ANION GAP SERPL CALCULATED.3IONS-SCNC: 12 MMOL/L (ref 9–17)
AST SERPL-CCNC: 37 U/L
BASOPHILS # BLD: 1 % (ref 0–2)
BASOPHILS ABSOLUTE: 0.06 K/UL (ref 0–0.2)
BILIRUB SERPL-MCNC: 0.23 MG/DL (ref 0.3–1.2)
BUN BLDV-MCNC: 8 MG/DL (ref 6–20)
BUN/CREAT BLD: 11 (ref 9–20)
CALCIUM SERPL-MCNC: 9.2 MG/DL (ref 8.6–10.4)
CHLORIDE BLD-SCNC: 100 MMOL/L (ref 98–107)
CO2: 24 MMOL/L (ref 20–31)
CREAT SERPL-MCNC: 0.71 MG/DL (ref 0.5–0.9)
DIFFERENTIAL TYPE: ABNORMAL
EOSINOPHILS RELATIVE PERCENT: 4 % (ref 1–4)
GFR AFRICAN AMERICAN: >60 ML/MIN
GFR NON-AFRICAN AMERICAN: >60 ML/MIN
GFR SERPL CREATININE-BSD FRML MDRD: ABNORMAL ML/MIN/{1.73_M2}
GFR SERPL CREATININE-BSD FRML MDRD: ABNORMAL ML/MIN/{1.73_M2}
GLUCOSE BLD-MCNC: 93 MG/DL (ref 70–99)
HCT VFR BLD CALC: 39 % (ref 36.3–47.1)
HEMOGLOBIN: 11.8 G/DL (ref 11.9–15.1)
IMMATURE GRANULOCYTES: 0 %
LACTIC ACID, WHOLE BLOOD: NORMAL MMOL/L (ref 0.7–2.1)
LACTIC ACID: 1.2 MMOL/L (ref 0.5–2.2)
LIPASE: 41 U/L (ref 13–60)
LYMPHOCYTES # BLD: 36 % (ref 24–43)
MCH RBC QN AUTO: 27.3 PG (ref 25.2–33.5)
MCHC RBC AUTO-ENTMCNC: 30.3 G/DL (ref 28.4–34.8)
MCV RBC AUTO: 90.1 FL (ref 82.6–102.9)
MONOCYTES # BLD: 5 % (ref 3–12)
NRBC AUTOMATED: 0 PER 100 WBC
PDW BLD-RTO: 13.3 % (ref 11.8–14.4)
PLATELET # BLD: 352 K/UL (ref 138–453)
PLATELET ESTIMATE: ABNORMAL
PMV BLD AUTO: 10.4 FL (ref 8.1–13.5)
POTASSIUM SERPL-SCNC: 4.1 MMOL/L (ref 3.7–5.3)
RBC # BLD: 4.33 M/UL (ref 3.95–5.11)
RBC # BLD: ABNORMAL 10*6/UL
SEG NEUTROPHILS: 54 % (ref 36–65)
SEGMENTED NEUTROPHILS ABSOLUTE COUNT: 4.75 K/UL (ref 1.5–8.1)
SODIUM BLD-SCNC: 136 MMOL/L (ref 135–144)
TOTAL PROTEIN: 6.9 G/DL (ref 6.4–8.3)
WBC # BLD: 8.7 K/UL (ref 3.5–11.3)
WBC # BLD: ABNORMAL 10*3/UL

## 2021-10-02 PROCEDURE — 6360000002 HC RX W HCPCS: Performed by: STUDENT IN AN ORGANIZED HEALTH CARE EDUCATION/TRAINING PROGRAM

## 2021-10-02 PROCEDURE — 99283 EMERGENCY DEPT VISIT LOW MDM: CPT

## 2021-10-02 PROCEDURE — 6370000000 HC RX 637 (ALT 250 FOR IP): Performed by: STUDENT IN AN ORGANIZED HEALTH CARE EDUCATION/TRAINING PROGRAM

## 2021-10-02 PROCEDURE — 99222 1ST HOSP IP/OBS MODERATE 55: CPT | Performed by: STUDENT IN AN ORGANIZED HEALTH CARE EDUCATION/TRAINING PROGRAM

## 2021-10-02 PROCEDURE — 2580000003 HC RX 258: Performed by: STUDENT IN AN ORGANIZED HEALTH CARE EDUCATION/TRAINING PROGRAM

## 2021-10-02 PROCEDURE — 83690 ASSAY OF LIPASE: CPT

## 2021-10-02 PROCEDURE — 6360000004 HC RX CONTRAST MEDICATION: Performed by: EMERGENCY MEDICINE

## 2021-10-02 PROCEDURE — 96365 THER/PROPH/DIAG IV INF INIT: CPT

## 2021-10-02 PROCEDURE — 96375 TX/PRO/DX INJ NEW DRUG ADDON: CPT

## 2021-10-02 PROCEDURE — 93005 ELECTROCARDIOGRAM TRACING: CPT

## 2021-10-02 PROCEDURE — 6360000002 HC RX W HCPCS: Performed by: EMERGENCY MEDICINE

## 2021-10-02 PROCEDURE — 1200000000 HC SEMI PRIVATE

## 2021-10-02 PROCEDURE — 36415 COLL VENOUS BLD VENIPUNCTURE: CPT

## 2021-10-02 PROCEDURE — 85025 COMPLETE CBC W/AUTO DIFF WBC: CPT

## 2021-10-02 PROCEDURE — 80053 COMPREHEN METABOLIC PANEL: CPT

## 2021-10-02 PROCEDURE — 74177 CT ABD & PELVIS W/CONTRAST: CPT

## 2021-10-02 PROCEDURE — 99285 EMERGENCY DEPT VISIT HI MDM: CPT

## 2021-10-02 PROCEDURE — 83605 ASSAY OF LACTIC ACID: CPT

## 2021-10-02 RX ORDER — ONDANSETRON 2 MG/ML
4 INJECTION INTRAMUSCULAR; INTRAVENOUS ONCE
Status: COMPLETED | OUTPATIENT
Start: 2021-10-02 | End: 2021-10-02

## 2021-10-02 RX ORDER — MORPHINE SULFATE 4 MG/ML
4 INJECTION, SOLUTION INTRAMUSCULAR; INTRAVENOUS ONCE
Status: COMPLETED | OUTPATIENT
Start: 2021-10-02 | End: 2021-10-02

## 2021-10-02 RX ORDER — ACETAMINOPHEN 650 MG/1
650 SUPPOSITORY RECTAL EVERY 6 HOURS PRN
Status: DISCONTINUED | OUTPATIENT
Start: 2021-10-02 | End: 2021-10-03 | Stop reason: HOSPADM

## 2021-10-02 RX ORDER — FLUOXETINE HYDROCHLORIDE 20 MG/1
40 CAPSULE ORAL DAILY
COMMUNITY
End: 2022-01-07

## 2021-10-02 RX ORDER — SODIUM CHLORIDE 0.9 % (FLUSH) 0.9 %
5-40 SYRINGE (ML) INJECTION PRN
Status: DISCONTINUED | OUTPATIENT
Start: 2021-10-02 | End: 2021-10-03 | Stop reason: HOSPADM

## 2021-10-02 RX ORDER — ACETAMINOPHEN 325 MG/1
650 TABLET ORAL EVERY 6 HOURS PRN
Status: DISCONTINUED | OUTPATIENT
Start: 2021-10-02 | End: 2021-10-03 | Stop reason: HOSPADM

## 2021-10-02 RX ORDER — LANOLIN ALCOHOL/MO/W.PET/CERES
10 CREAM (GRAM) TOPICAL NIGHTLY PRN
COMMUNITY

## 2021-10-02 RX ORDER — HYDROCHLOROTHIAZIDE 25 MG/1
12.5 TABLET ORAL DAILY
Status: DISCONTINUED | OUTPATIENT
Start: 2021-10-02 | End: 2021-10-03 | Stop reason: HOSPADM

## 2021-10-02 RX ORDER — LOSARTAN POTASSIUM 50 MG/1
50 TABLET ORAL DAILY
Status: DISCONTINUED | OUTPATIENT
Start: 2021-10-02 | End: 2021-10-03 | Stop reason: HOSPADM

## 2021-10-02 RX ORDER — MAGNESIUM SULFATE IN WATER 40 MG/ML
2000 INJECTION, SOLUTION INTRAVENOUS ONCE
Status: COMPLETED | OUTPATIENT
Start: 2021-10-02 | End: 2021-10-02

## 2021-10-02 RX ORDER — SODIUM CHLORIDE 9 MG/ML
25 INJECTION, SOLUTION INTRAVENOUS PRN
Status: DISCONTINUED | OUTPATIENT
Start: 2021-10-02 | End: 2021-10-03 | Stop reason: HOSPADM

## 2021-10-02 RX ORDER — FLUOXETINE HYDROCHLORIDE 20 MG/1
40 CAPSULE ORAL DAILY
Status: DISCONTINUED | OUTPATIENT
Start: 2021-10-02 | End: 2021-10-02

## 2021-10-02 RX ORDER — ONDANSETRON 2 MG/ML
4 INJECTION INTRAMUSCULAR; INTRAVENOUS EVERY 6 HOURS PRN
Status: DISCONTINUED | OUTPATIENT
Start: 2021-10-02 | End: 2021-10-03 | Stop reason: HOSPADM

## 2021-10-02 RX ORDER — LOSARTAN POTASSIUM AND HYDROCHLOROTHIAZIDE 12.5; 5 MG/1; MG/1
1 TABLET ORAL DAILY
Status: DISCONTINUED | OUTPATIENT
Start: 2021-10-02 | End: 2021-10-02

## 2021-10-02 RX ORDER — POLYETHYLENE GLYCOL 3350 17 G/17G
17 POWDER, FOR SOLUTION ORAL DAILY PRN
Status: DISCONTINUED | OUTPATIENT
Start: 2021-10-02 | End: 2021-10-03 | Stop reason: HOSPADM

## 2021-10-02 RX ORDER — ATORVASTATIN CALCIUM 20 MG/1
20 TABLET, FILM COATED ORAL DAILY
Status: DISCONTINUED | OUTPATIENT
Start: 2021-10-02 | End: 2021-10-02

## 2021-10-02 RX ORDER — SODIUM CHLORIDE 0.9 % (FLUSH) 0.9 %
5-40 SYRINGE (ML) INJECTION EVERY 12 HOURS SCHEDULED
Status: DISCONTINUED | OUTPATIENT
Start: 2021-10-02 | End: 2021-10-03 | Stop reason: HOSPADM

## 2021-10-02 RX ORDER — ONDANSETRON 4 MG/1
4 TABLET, ORALLY DISINTEGRATING ORAL EVERY 8 HOURS PRN
Status: DISCONTINUED | OUTPATIENT
Start: 2021-10-02 | End: 2021-10-03 | Stop reason: HOSPADM

## 2021-10-02 RX ADMIN — IOPAMIDOL 75 ML: 755 INJECTION, SOLUTION INTRAVENOUS at 06:43

## 2021-10-02 RX ADMIN — ONDANSETRON 4 MG: 2 INJECTION INTRAMUSCULAR; INTRAVENOUS at 05:05

## 2021-10-02 RX ADMIN — FLUOXETINE HYDROCHLORIDE 40 MG: 20 CAPSULE ORAL at 14:08

## 2021-10-02 RX ADMIN — MORPHINE SULFATE 4 MG: 4 INJECTION, SOLUTION INTRAMUSCULAR; INTRAVENOUS at 05:04

## 2021-10-02 RX ADMIN — MAGNESIUM SULFATE HEPTAHYDRATE 2000 MG: 40 INJECTION, SOLUTION INTRAVENOUS at 07:19

## 2021-10-02 RX ADMIN — SODIUM CHLORIDE, PRESERVATIVE FREE 10 ML: 5 INJECTION INTRAVENOUS at 14:08

## 2021-10-02 RX ADMIN — IOPAMIDOL 18 ML: 755 INJECTION, SOLUTION INTRAVENOUS at 06:43

## 2021-10-02 RX ADMIN — SODIUM CHLORIDE, PRESERVATIVE FREE 10 ML: 5 INJECTION INTRAVENOUS at 22:32

## 2021-10-02 RX ADMIN — ENOXAPARIN SODIUM 40 MG: 40 INJECTION SUBCUTANEOUS at 22:31

## 2021-10-02 ASSESSMENT — PAIN DESCRIPTION - ORIENTATION
ORIENTATION: MID
ORIENTATION: MID

## 2021-10-02 ASSESSMENT — PAIN DESCRIPTION - LOCATION
LOCATION: ABDOMEN
LOCATION: ABDOMEN

## 2021-10-02 ASSESSMENT — ENCOUNTER SYMPTOMS
RHINORRHEA: 0
SORE THROAT: 0
ABDOMINAL PAIN: 1
VOMITING: 1
COUGH: 0
RHINORRHEA: 0
VOMITING: 0
WHEEZING: 0
ABDOMINAL DISTENTION: 0
BACK PAIN: 0
DIARRHEA: 0
CONSTIPATION: 0
NAUSEA: 0
ABDOMINAL PAIN: 0
SHORTNESS OF BREATH: 0
NAUSEA: 1
DIARRHEA: 0
COUGH: 0
SHORTNESS OF BREATH: 0

## 2021-10-02 ASSESSMENT — PAIN SCALES - GENERAL
PAINLEVEL_OUTOF10: 1
PAINLEVEL_OUTOF10: 7
PAINLEVEL_OUTOF10: 0
PAINLEVEL_OUTOF10: 2

## 2021-10-02 ASSESSMENT — PAIN DESCRIPTION - DESCRIPTORS
DESCRIPTORS: CONSTANT
DESCRIPTORS: CONSTANT

## 2021-10-02 ASSESSMENT — PAIN DESCRIPTION - PAIN TYPE
TYPE: ACUTE PAIN

## 2021-10-02 NOTE — CONSULTS
General Surgery:    Consult Note        PATIENT NAME: Rosalba Landers   YOB: 1990    ADMISSION DATE: 10/2/2021 10:18 AM     Consulted Physician: Dr. Paredes Overall DATE: 10/2/2021    Chief Complaint: Abdominal pain  Consult Regarding: Umbilical hernia    HISTORY OF PRESENT ILLNESS:  The patient is a 27 y.o. female  who presented to an outside facility with chief complaint of worsening abdominal pain. Patient was found to have an umbilical hernia. Per the patient the hernia was reduced. Patient underwent CT imaging with evidence of a 5 cm neck umbilical hernia. Patient reports she first noticed the hernia around 6 months ago. Patient reports that she usually does not have any pain associated with the umbilical hernia. Patient reports that she originally weighed 550 pounds but has been actively losing weight, her current weight today is 435 pounds. BMI66. Patient ports she did have one episode of vomiting on Friday but this is since resolved. No further nausea. Patient currently denies any fever, chills, nausea, vomiting, chest pain, shortness of breath. Patient reports has been having normal bowel function. Patient reports that she is an active smoker, she uses a vap pen and smokes marijuana daily. Patient did receive her Covid vaccine x2. Patient reports that she has a past medical history of mitral valve prolapse, she is unsure of the severity of the prolapse. Past Medical History:        Diagnosis Date    Anemia 2005    Depression 2016    GERD (gastroesophageal reflux disease)     Herpes simplex type 2 (HSV-2) infection affecting pregnancy, antepartum     Hyperlipidemia     Hypertension 2016    Hypothyroidism 1995    MVP (mitral valve prolapse)        Past Surgical History:        Procedure Laterality Date    WISDOM TOOTH EXTRACTION         Medications Prior to Admission:   Not in a hospital admission.     Allergies:  Penicillins and Tape Arn Screen tape]    Social History: Emphysema Paternal Grandfather     COPD Paternal Grandfather     High Blood Pressure Brother     Heart Disease Paternal Grandmother     Thyroid Disease Mother        REVIEW OF SYSTEMS:    CONSTITUTIONAL:  negative for  fevers, chills and fatigue  No recent weight gain/loss. Energy level normal for pt. HEENT:  No nasal congestion or drainage. CARDIOVASCULAR:  No chest pain  GASTROINTESTINAL:  + abdominal pain, denies nausea, or vomiting. No constipation/diarrhea. No rectal bleeding  GENITOURINARY:  No dysuria  HEMATOLOGIC/LYMPHATIC:  No easy bruising. No history of cancer  ENDOCRINE: negative DM  Review of systems negative unless listed above. PHYSICAL EXAM:    VITALS:  Temp 98.1 °F (36.7 °C) (Oral)   Resp 20   Ht 5' 8\" (1.727 m)   Wt (!) 435 lb (197.3 kg)   BMI 66.14 kg/m²   INTAKE/OUTPUT:   No intake or output data in the 24 hours ending 10/02/21 1128    CONSTITUTIONAL:  awake, alert, not distressed and morbidly obese  ENT:  normocephalic/atraumatic, without obvious abnormality  NECK:  supple, symmetrical, trachea midline   LUNGS:  CTA bilaterally  CARDIOVASCULAR:  regular rate and rhythm  ABDOMEN: Morbidly obese, soft, reducible umbilical hernia, no peritoneal signs, no rebound tenderness, no overlying skin changes  SKIN: No rashes or skin lesions noted  MUSCULOSKELETAL:  No joint swelling, deformity, or tenderness. , there is not obvious somatic dysfunction  NEUROLOGIC:  Mental Status Exam:  Level of Alertness:   alert  Orientation:   oriented to person, place, and time      CBC with Differential:    Lab Results   Component Value Date    WBC 8.7 10/02/2021    RBC 4.33 10/02/2021    RBC 4.52 09/14/2017    HGB 11.8 10/02/2021    HCT 39.0 10/02/2021     10/02/2021    MCV 90.1 10/02/2021    MCH 27.3 10/02/2021    MCHC 30.3 10/02/2021    RDW 13.3 10/02/2021    LYMPHOPCT 36 10/02/2021    LYMPHOPCT 25.4 09/14/2017    MONOPCT 5 10/02/2021    EOSPCT 2.4 09/14/2017    BASOPCT 1 10/02/2021    BASOPCT 0.8 rectus muscles. Wide neck umbilical hernia containing fat and fluid. The sac measures about 5 cm. No acute bony abnormality. 1. Diastasis of the rectus muscles with a wide necked umbilical hernia. The sac measures about 5 cm in size and contains fat and fluid. 2. A 6.2 cm left adnexal mass likely left ovary. Please see follow-up recommendations below. 3. Hepatomegaly. No focal liver lesion. RECOMMENDATIONS: 6.2 cm benign appearing ovarian cyst.  Recommend follow-up pelvic US in 6-12 weeks. Reference: Paola [facility] Radiol [id number];01:[id number]         ASSESSMENT:  27 y.o. female with morbid obesity ,reducible umbilical hernia with rectus diastases, mitral valve prolapse, smoking, marijuana abuse      Plan:  Patient seen examined at bedside. Labs and imaging reviewed. CT with 5 cm neck umbilical hernia. Hernia easily reducible with diastases recti. No white count. On physical exam the hernia can be reduced with minimal tenderness to palpation. Recommend admission to internal medicine team as patient has a history of mitral valve prolapse, she is unsure of her cardiac diagnosis. Recommend work-up for possible surgical intervention on 10/4/2021. Discussed surgical repair with patient. CeDAR score 70% due to patient's morbid obesity, active smoking. Discussed in depth with patient that she has a very high chance of hernia recurrence after operative intervention. Electronically signed by Eron Higgins DO  on 10/2/2021 at 11:28 AM     Attending Physician Statement  I have discussed the case with Dr Laura Perdue, including pertinent history and exam findings with the resident. I have seen and examined the patient and the key elements of the encounter have been performed by me. I agree with the assessment, plan and orders as documented by the resident.       Electronically signed by Rosmery Xie IV, DO  on 10/3/2021 at 2:18 PM

## 2021-10-02 NOTE — ED NOTES
Bed: 08  Expected date:   Expected time:   Means of arrival:   Comments:  Jim Hernandez RN  10/02/21 1011

## 2021-10-02 NOTE — PLAN OF CARE
Problem: Falls - Risk of:  Goal: Will remain free from falls  Description: Will remain free from falls  Outcome: Ongoing  Goal: Absence of physical injury  Description: Absence of physical injury  Outcome: Ongoing     Problem: Preoperative Routine:  Goal: Risk for injury before, during, or after surgery or procedure will decrease  Description: Risk for injury before, during, or after surgery or procedure will decrease  Outcome: Ongoing     Problem: Anxiety:  Goal: Ability to modify response to factors that promote anxiety will improve  Description: Ability to modify response to factors that promote anxiety will improve  Outcome: Ongoing  Goal: Level of anxiety will decrease  Description: Level of anxiety will decrease  Outcome: Ongoing     Problem: Gas Exchange - Impaired:  Goal: Levels of oxygenation will improve  Description: Levels of oxygenation will improve  Outcome: Ongoing     Problem: Pain - Acute:  Goal: Recognizes and communicates pain  Description: Recognizes and communicates pain  Outcome: Ongoing     Problem: Tobacco Use:  Goal: Will participate in inpatient tobacco-use cessation counseling  Description: Will participate in inpatient tobacco-use cessation counseling  Outcome: Ongoing

## 2021-10-02 NOTE — ED PROVIDER NOTES
[facility] ED     Emergency Department     Faculty Attestation    I performed a history and physical examination of the patient and discussed management with the resident. I reviewed the residents note and agree with the documented findings and plan of care. Any areas of disagreement are noted on the chart. I was personally present for the key portions of any procedures. I have documented in the chart those procedures where I was not present during the key portions. I have reviewed the emergency nurses triage note. I agree with the chief complaint, past medical history, past surgical history, allergies, medications, social and family history as documented unless otherwise noted below. For Physician Assistant/ Nurse Practitioner cases/documentation I have personally evaluated this patient and have completed at least one if not all key elements of the E/M (history, physical exam, and MDM). Additional findings are as noted. This patient was evaluated in the Emergency Department for symptoms described in the history of present illness. He/she was evaluated in the context of the global COVID-19 pandemic, which necessitated consideration that the patient might be at risk for infection with the SARS-CoV-2 virus that causes COVID-19. Institutional protocols and algorithms that pertain to the evaluation of patients at risk for COVID-19 are in a state of rapid change based on information released by regulatory bodies including the CDC and federal and state organizations. These policies and algorithms were followed during the patient's care in the ED. Patient transferred from [facility] for concern for incarcerated hernia. Currently pain is controlled. No nausea no vomiting no fevers.   Will review work-up, call general surgery      Critical Care     none    Donita Madera MD, Agatha Lima  Attending Emergency  Physician             Donita Madera MD  10/02/21 5649

## 2021-10-02 NOTE — ED PROVIDER NOTES
of Health     Financial Resource Strain:     Difficulty of Paying Living Expenses:    Food Insecurity:     Worried About [address] in the Last Year:     [address] in the Last Year:    Transportation Needs:     Lack of Transportation (Medical):  Lack of Transportation (Non-Medical):    Physical Activity:     Days of Exercise per Week:     Minutes of Exercise per Session:    Stress:     Feeling of Stress :    Social Connections:     Frequency of Communication with Friends and Family:     Frequency of Social Gatherings with Friends and Family:     Attends [employer] Services:     Active Member of Clubs or Organizations:     Attends Club or Organization Meetings:     Marital Status:    Intimate Partner Violence:     Fear of Current or Ex-Partner:     Emotionally Abused:     Physically Abused:     Sexually Abused:        Family History   Problem Relation Age of Onset    High Blood Pressure Father     Heart Disease Father     Emphysema Father     COPD Father     Elevated Lipids Father     Atrial Fibrillation Father     Diabetes Maternal Grandfather     Heart Disease Paternal Grandfather     Diabetes Paternal Grandfather     High Blood Pressure Paternal Grandfather     Emphysema Paternal Grandfather     COPD Paternal Grandfather     High Blood Pressure Brother     Heart Disease Paternal Grandmother     Thyroid Disease Mother        Allergies:  Penicillins and Tape [adhesive tape]    Home Medications:  Prior to Admission medications    Medication Sig Start Date End Date Taking? Authorizing Provider   FLUoxetine (PROZAC) 20 MG capsule Take 40 mg by mouth daily   Yes Historical Provider, MD   melatonin 3 MG TABS tablet Take 10 mg by mouth nightly as needed    Historical Provider, MD       REVIEW OF SYSTEMS    (2-9 systems for level 4, 10 or more for level 5)      Review of Systems   Constitutional: Negative for activity change, appetite change, fatigue and fever.    HENT: Negative for congestion, rhinorrhea and sore throat. Respiratory: Negative for cough, shortness of breath and wheezing. Cardiovascular: Negative for chest pain, palpitations and leg swelling. Gastrointestinal: Positive for abdominal pain, nausea and vomiting. Negative for abdominal distention, constipation and diarrhea. Genitourinary: Negative for decreased urine volume and dysuria. Skin: Negative for rash. Neurological: Negative for dizziness, weakness, light-headedness, numbness and headaches. PHYSICAL EXAM   (up to 7 for level 4, 8 or more for level 5)     INITIAL VITALS:   BP (!) 149/74   Pulse 79   Temp 98.5 °F (36.9 °C)   Resp 22   Ht 5' 8\" (1.727 m)   Wt (!) 435 lb (197.3 kg)   SpO2 100%   BMI 66.14 kg/m²     Physical Exam  Constitutional:       General: She is not in acute distress. Appearance: Normal appearance. She is not ill-appearing. HENT:      Head: Normocephalic and atraumatic. Eyes:      General:         Right eye: No discharge. Left eye: No discharge. Extraocular Movements: Extraocular movements intact. Pupils: Pupils are equal, round, and reactive to light. Cardiovascular:      Rate and Rhythm: Normal rate. Pulmonary:      Effort: Pulmonary effort is normal. No respiratory distress. Abdominal:      Comments: bmi of 66, umbilical hernia noted that is bluish colored,and hard, and very tender to palpation. Unable to be reduced. Musculoskeletal:      Right lower leg: No edema. Left lower leg: No edema. Neurological:      General: No focal deficit present. Mental Status: She is alert and oriented to person, place, and time. DIFFERENTIAL  DIAGNOSIS     Patient with incarcerated hernia. She was placed in trendelenburg, and pressure was held on hernia, and defect was palpated, and with pressure, was able to be reduced. Patient did have significant pain which was relieved with reduction of hernia.  Patient given additional pain medications, she is morbidly obese, and this will likely happened again, will plan on ct imaging, and surgery consultation, check labs, and lactic acid.     PLAN (LABS / IMAGING / EKG):  Orders Placed This Encounter   Procedures    CT ABDOMEN PELVIS W IV CONTRAST Additional Contrast? Oral    CBC Auto Differential    Comprehensive Metabolic Panel    Lactic Acid, Plasma    Lipase    Insert peripheral IV       MEDICATIONS ORDERED:  Orders Placed This Encounter   Medications    morphine injection 4 mg    ondansetron (ZOFRAN) injection 4 mg    iopamidol (ISOVUE-370) 76 % injection 18 mL    iopamidol (ISOVUE-370) 76 % injection 75 mL    magnesium sulfate 2000 mg in 50 mL IVPB premix       DIAGNOSTIC RESULTS / EMERGENCY DEPARTMENT COURSE / MDM     LABS:  Results for orders placed or performed during the hospital encounter of 10/02/21   CBC Auto Differential   Result Value Ref Range    WBC 8.7 3.5 - 11.3 k/uL    RBC 4.33 3.95 - 5.11 m/uL    Hemoglobin 11.8 (L) 11.9 - 15.1 g/dL    Hematocrit 39.0 36.3 - 47.1 %    MCV 90.1 82.6 - 102.9 fL    MCH 27.3 25.2 - 33.5 pg    MCHC 30.3 28.4 - 34.8 g/dL    RDW 13.3 11.8 - 14.4 %    Platelets 495 713 - 565 k/uL    MPV 10.4 8.1 - 13.5 fL    NRBC Automated 0.0 0.0 per 100 WBC    Differential Type NOT REPORTED     Seg Neutrophils 54 36 - 65 %    Lymphocytes 36 24 - 43 %    Monocytes 5 3 - 12 %    Eosinophils % 4 1 - 4 %    Basophils 1 0 - 2 %    Immature Granulocytes 0 0 %    Segs Absolute 4.75 1.50 - 8.10 k/uL    Absolute Lymph # 3.13 1.10 - 3.70 k/uL    Absolute Mono # 0.45 0.10 - 1.20 k/uL    Absolute Eos # 0.31 0.00 - 0.44 k/uL    Basophils Absolute 0.06 0.00 - 0.20 k/uL    Absolute Immature Granulocyte <0.03 0.00 - 0.30 k/uL    WBC Morphology NOT REPORTED     RBC Morphology NOT REPORTED     Platelet Estimate NOT REPORTED    Comprehensive Metabolic Panel   Result Value Ref Range    Glucose 93 70 - 99 mg/dL    BUN 8 6 - 20 mg/dL    CREATININE 0.71 0.50 - 0.90 mg/dL    Bun/Cre Ratio 11 9 - 20    Calcium 9.2 8.6 - 10.4 mg/dL    Sodium 136 135 - 144 mmol/L    Potassium 4.1 3.7 - 5.3 mmol/L    Chloride 100 98 - 107 mmol/L    CO2 24 20 - 31 mmol/L    Anion Gap 12 9 - 17 mmol/L    Alkaline Phosphatase 84 35 - 104 U/L    ALT 43 (H) 5 - 33 U/L    AST 37 (H) <32 U/L    Total Bilirubin 0.23 (L) 0.3 - 1.2 mg/dL    Total Protein 6.9 6.4 - 8.3 g/dL    Albumin 4.0 3.5 - 5.2 g/dL    Albumin/Globulin Ratio 1.4 1.0 - 2.5    GFR Non-African American >60 >60 mL/min    GFR African American >60 >60 mL/min    GFR Comment          GFR Staging         Lactic Acid, Plasma   Result Value Ref Range    Lactic Acid 1.2 0.5 - 2.2 mmol/L    Lactic Acid, Whole Blood NOT REPORTED 0.7 - 2.1 mmol/L   Lipase   Result Value Ref Range    Lipase 41 13 - 60 U/L       IMPRESSION: hernia    RADIOLOGY:  CT ABDOMEN PELVIS W IV CONTRAST Additional Contrast? Oral   Final Result   1. Diastasis of the rectus muscles with a wide necked umbilical hernia. The   sac measures about 5 cm in size and contains fat and fluid. 2. A 6.2 cm left adnexal mass likely left ovary. Please see follow-up   recommendations below. 3. Hepatomegaly. No focal liver lesion. RECOMMENDATIONS:   6.2 cm benign appearing ovarian cyst.  Recommend follow-up pelvic US in 6-12   weeks. Reference: J Am Arslan Radiol 2013;10:[id number]               EKG:  EKG shows normal sinus rhythm, normal axis ventricular rate of 97 some subtle ST elevations noted in V4 and V5 and V3. No reciprocal changes noted, T wave inversion noted in lead I and aVL. NE interval of 110 QRS of 110 and a QT corrected of 579. POC ULTRASOUND:      EMERGENCY DEPARTMENT COURSE:  Patient with reducible hernia    7:24 AM EDT  Given concern for reoccurance, I spoke with Dr. Gayle Alfred on call for surgery who recommends patient be transferred to higher level of care. 10:10 AM EDT  Spoke with Dr. Sd Berkowitz as [facility] ER, for transfer and he accepted.   Patient agreeable with plan PROCEDURES:      CONSULTS:  None    CRITICAL CARE:      FINAL IMPRESSION      1. Periumbilical hernia          DISPOSITION / PLAN     DISPOSITION        PATIENT REFERRED TO:  No follow-up provider specified.     DISCHARGE MEDICATIONS:  Discharge Medication List as of 10/2/2021  8:51 AM          Vance Fan DO  10:10 AM    Attending Emergency Physician  [facility] ED    (Please note that portions of this note were completed with a voice recognition program.  Effortswere made to edit the dictations but occasionally words are mis-transcribed.)              Etelvina Laurent DO  10/04/21 1013

## 2021-10-02 NOTE — ED PROVIDER NOTES
Viviana Wong  ED  Emergency Department Encounter  Emergency Medicine Resident     Pt Name: Blanca Coffman  MRN: [id number]  [place] 1990  Date of evaluation: 10/2/21  PCP:  [facility]       Chief Complaint   Patient presents with    Abdominal Pain     having surgery for incarcerated hernia       HISTORY OFPRESENT ILLNESS  (Location/Symptom, Timing/Onset, Context/Setting, Quality, Duration, Modifying Factors,Severity.)      Blanca Coffman is a 27 y.o. female who presents as a transfer from [place] for umbilical hernia. The patient started having abdominal pain yesterday, then noticed a bulge in her umbilicus. Pain worsened throughout the night. She did become nauseous and vomit once yesterday. She was seen at [facility] early this morning and CT demonstrated diastases of the rectus muscles with a wide necked umbilical hernia, sac measuring 5 cm, containing fat and fluid. Surgical team at outside facility was not comfortable with this case due to patient's body habitus and she was transferred here for surgical evaluation. On arrival here, patient is comfortable, pain is only present when palpating the abdomen or moving denies any significant nausea or vomiting at this time. No chest pain, shortness of breath, fevers, chills, weakness, numbness, tingling, other complaints this time    PAST MEDICAL / SURGICAL / SOCIAL / FAMILY HISTORY      has a past medical history of Anemia, Depression, GERD (gastroesophageal reflux disease), Herpes simplex type 2 (HSV-2) infection affecting pregnancy, antepartum, Hyperlipidemia, Hypertension, Hypothyroidism, and MVP (mitral valve prolapse). has a past surgical history that includes [place] tooth extraction.      Social History     Socioeconomic History    Marital status:      Spouse name: Not on file    Number of children: Not on file    Years of education: Not on file    Highest education level: Not on file Occupational History    Not on file   Tobacco Use    Smoking status: Former Smoker     Types: Cigarettes     Quit date: 2014     Years since quittin.0    Smokeless tobacco: Never Used   Vaping Use    Vaping Use: Every day    Substances: Nicotine   Substance and Sexual Activity    Alcohol use: Yes     Comment: rare    Drug use: Yes     Types: Marijuana     Comment: daily    Sexual activity: Yes     Partners: Male   Other Topics Concern    Not on file   Social History Narrative    Not on file     Social Determinants of Health     Financial Resource Strain:     Difficulty of Paying Living Expenses:    Food Insecurity:     Worried About Running Out of Food in the Last Year:     Ran Out of Food in the Last Year:    Transportation Needs:     Lack of Transportation (Medical):      Lack of Transportation (Non-Medical):    Physical Activity:     Days of Exercise per Week:     Minutes of Exercise per Session:    Stress:     Feeling of Stress :    Social Connections:     Frequency of Communication with Friends and Family:     Frequency of Social Gatherings with Friends and Family:     Attends Mandaen Services:     Active Member of Clubs or Organizations:     Attends Club or Organization Meetings:     Marital Status:    Intimate Partner Violence:     Fear of Current or Ex-Partner:     Emotionally Abused:     Physically Abused:     Sexually Abused:        Family History   Problem Relation Age of Onset    High Blood Pressure Father     Heart Disease Father     Emphysema Father     COPD Father     Elevated Lipids Father     Atrial Fibrillation Father     Diabetes Maternal Grandfather     Heart Disease Paternal Grandfather     Diabetes Paternal Grandfather     High Blood Pressure Paternal Grandfather     Emphysema Paternal Grandfather     COPD Paternal Grandfather     High Blood Pressure Brother     Heart Disease Paternal Grandmother     Thyroid Disease Mother Allergies:  Penicillins and Tape [adhesive tape]    Home Medications:  Prior to Admission medications    Medication Sig Start Date End Date Taking? Authorizing Provider   FLUoxetine (PROZAC) 20 MG capsule Take 40 mg by mouth daily   Yes Historical Provider, MD   melatonin 3 MG TABS tablet Take 10 mg by mouth nightly as needed   Yes Historical Provider, MD       REVIEW OFSYSTEMS    (2-9 systems for level 4, 10 or more for level 5)      Review of Systems   Constitutional: Negative for chills and fever. HENT: Negative for congestion and rhinorrhea. Eyes: Negative for visual disturbance. Respiratory: Negative for cough and shortness of breath. Cardiovascular: Negative for chest pain. Gastrointestinal: Negative for abdominal pain, diarrhea, nausea and vomiting. Genitourinary: Negative for dysuria. Musculoskeletal: Negative for back pain and neck pain. Skin: Negative for rash. Neurological: Negative for weakness, numbness and headaches. PHYSICAL EXAM   (up to 7 for level 4, 8 or more forlevel 5)      INITIAL VITALS:   ED Triage Vitals [10/02/21 1019]   BP Temp Temp Source Pulse Resp SpO2 Height Weight   -- 98.1 °F (36.7 °C) Oral -- 20 -- 5' 8\" (1.727 m) (!) 435 lb (197.3 kg)       Physical Exam  Constitutional:       General: She is not in acute distress. Appearance: Normal appearance. She is normal weight. She is not ill-appearing, toxic-appearing or diaphoretic. HENT:      Head: Normocephalic and atraumatic. Nose: Nose normal.      Mouth/Throat:      Mouth: Mucous membranes are moist.      Pharynx: Oropharynx is clear. No oropharyngeal exudate or posterior oropharyngeal erythema. Eyes:      Extraocular Movements: Extraocular movements intact. Pupils: Pupils are equal, round, and reactive to light. Cardiovascular:      Rate and Rhythm: Normal rate and regular rhythm. Heart sounds: Normal heart sounds. No murmur heard.      Pulmonary:      Effort: Pulmonary effort is normal. No respiratory distress. Breath sounds: Normal breath sounds. No wheezing, rhonchi or rales. Abdominal:      General: There is no distension. Palpations: Abdomen is soft. Comments: Abdomen is soft, significant tenderness palpation the periumbilical and umbilical regions. Umbilical hernia present, feels soft at this time. No other significant distention. Guarding near the umbilical region but otherwise no rebound. Musculoskeletal:         General: No tenderness. Normal range of motion. Cervical back: Normal range of motion and neck supple. Right lower leg: No edema. Left lower leg: No edema. Skin:     General: Skin is warm and dry. Neurological:      General: No focal deficit present. Mental Status: She is alert and oriented to person, place, and time. Motor: No weakness. Psychiatric:         Mood and Affect: Mood normal.         DIFFERENTIAL  DIAGNOSIS     PLAN (LABS / IMAGING / EKG):  Orders Placed This Encounter   Procedures    Basic Metabolic Panel w/ Reflex to MG    Hepatic function panel    CBC auto differential    ADULT DIET;  Regular; 5 carb choices (75 gm/meal)    Telemetry monitoring - continuous duration    Vital signs per unit routine    Up as tolerated    Full Code    Inpatient consult to General Surgery    Inpatient consult to Hospitalist    Inpatient consult to Respiratory Care    Initiate Oxygen Therapy Protocol    CPAP    EKG 12 Lead    ECHO Complete 2D W Doppler W Color    PATIENT STATUS (FROM ED OR OR/PROCEDURAL) Inpatient       MEDICATIONS ORDERED:  Orders Placed This Encounter   Medications    DISCONTD: FLUoxetine (PROZAC) capsule 40 mg    sodium chloride flush 0.9 % injection 5-40 mL    sodium chloride flush 0.9 % injection 5-40 mL    0.9 % sodium chloride infusion    DISCONTD: enoxaparin (LOVENOX) injection 40 mg    OR Linked Order Group     ondansetron (ZOFRAN-ODT) disintegrating tablet 4 mg     ondansetron [facility]) injection 4 mg    polyethylene glycol (GLYCOLAX) packet 17 g    OR Linked Order Group     acetaminophen (TYLENOL) tablet 650 mg     acetaminophen (TYLENOL) suppository 650 mg    DISCONTD: atorvastatin (LIPITOR) tablet 20 mg    DISCONTD: losartan-hydroCHLOROthiazide (HYZAAR) 50-12.5 MG per tablet 1 tablet    AND Linked Order Group     losartan (COZAAR) tablet 50 mg     hydroCHLOROthiazide (HYDRODIURIL) tablet 12.5 mg    enoxaparin (LOVENOX) injection 40 mg       Initial MDM/Plan/ED COURSE:    27 y.o. female with history of mitral valve prolapse, hypertension, depression, morbid obesity who presents as a transfer from [place] for [place] umbilical hernia, possibly incarcerated. Exam, patient is in no acute distress and pain is controlled when she is not moving. Vital signs are stable. Patient is morbidly obese. Heart is regular rate and rhythm and lungs clear to auscultation all fields. Abdomen is soft, significantly tender in the periumbilical and umbilical regions with hernia present. Hernia is soft, does not have overlying skin changes, difficult to palpate the defect as patient is significantly tender and voluntarily guarding. Remainder of exam is unremarkable. Patient transferred to this facility as outside facility surgery team is not comfortable with surgical option in patient with this habitus. General surgery to be consulted. All of labs performed at outside facility appear within normal limits. We will continue to monitor and manage pain if it presents    With general surgery who would like to have the patient admitted for surgical treatment.   [facility] was consulted for admission and accepted the patient.      :     DIAGNOSTIC RESULTS / [address] [place] [address] / [facility]     LABS:  Labs Reviewed - No data to display        CT ABDOMEN PELVIS W IV CONTRAST Additional Contrast? Oral    Result Date: 10/2/2021  EXAMINATION: CT OF THE ABDOMEN AND PELVIS WITH CONTRAST 10/2/2021 6:51 am TECHNIQUE: CT of the abdomen and pelvis was performed with the administration of intravenous contrast. Multiplanar reformatted images are provided for review. Dose modulation, iterative reconstruction, and/or weight based adjustment of the mA/kV was utilized to reduce the radiation dose to as low as reasonably achievable. COMPARISON: No priors HISTORY: ORDERING SYSTEM PROVIDED HISTORY: Umbilical hernia TECHNOLOGIST PROVIDED HISTORY: Umbilical hernia Decision Support Exception - unselect if not a suspected or confirmed emergency medical condition->Emergency Medical Condition (MA) FINDINGS: Lower Chest: The visualized heart and lungs show no acute abnormalities. Organs: Hepatomegaly. No focal liver lesion. Spleen, pancreas, adrenal glands, kidneys show no significant abnormalities. Gallbladder unremarkable. GI/Bowel: Stomach is grossly normal.  Small bowel loops show no focal lesions. No obstruction. No evidence for appendicitis. The colon shows no acute process. Pelvis: Urinary bladder is unremarkable. Uterus grossly normal.  6.2 cm left adnexal mass likely the left ovary. Peritoneum/Retroperitoneum: No free intraperitoneal fluid or significant lymphadenopathy. Vascular structures enhance satisfactorily. Bones/Soft Tissues: Diastasis of the rectus muscles. Wide neck umbilical hernia containing fat and fluid. The sac measures about 5 cm. No acute bony abnormality. 1. Diastasis of the rectus muscles with a wide necked umbilical hernia. The sac measures about 5 cm in size and contains fat and fluid. 2. A 6.2 cm left adnexal mass likely left ovary. Please see follow-up recommendations below. 3. Hepatomegaly. No focal liver lesion. RECOMMENDATIONS: 6.2 cm benign appearing ovarian cyst.  Recommend follow-up pelvic US in 6-12 weeks.  Reference: J Am Arslan Radiol 2013;10:[id number]           EKG      All EKG's are interpreted by the Emergency Department Physicianwho either signs or Co-signs this chart in the absence of a cardiologist.      PROCEDURES:  None    CONSULTS:  IP CONSULT TO GENERAL SURGERY  IP CONSULT TO HOSPITALIST  IP CONSULT TO RESPIRATORY CARE    CRITICAL CARE:  Please see attending note    FINAL IMPRESSION      1. Reducible umbilical hernia          DISPOSITION / PLAN     DISPOSITION Admitted 10/02/2021 12:31:49 PM      PATIENT REFERRED TO:  No follow-up provider specified.     DISCHARGE MEDICATIONS:  Current Discharge Medication List          Sherilyn Gowers, DO  Emergency Medicine Resident    (Please note that portions of this note were completed with a voice recognition program.Efforts were made to edit the dictations but occasionally words are mis-transcribed.)       Sherilyn Gowers, DO  Resident  10/02/21 3680

## 2021-10-02 NOTE — ED NOTES
General Surgery at [facility] called back via [employer] and connected with Dr Marina Clark  10/02/21 5519

## 2021-10-02 NOTE — H&P
[facility]  Office: [address], DO, Meredith Din, DO, Oswald Acron, DO, Celestine Archuleta Blood, DO, Zulay Calvert MD, Joss Vale MD, Jacquelyn Rodgers MD, Jorden Jimenez MD, Jet Ramsey MD, Ashwin Polk MD, Thierry Ferrer MD, Bhupinder Armas, DO, Matthew Lam, DO, Cheyenne Baker MD,  Willard Mott, DO, Blair Wagoner MD, Anh Perez MD, Halina Davidson MD, Jeancarlos Oliva MD, , Braden Kumar MD, Phoebe Sung MD, Trice Vega MD, Yanely Bertrand, [facility], [facility], CNP, Sophie Golden, CNP, Denae Pratt, CNS, Mendez Rouse, CNP, Adelia Alpers, CNP, Davin Jones, CNP, Dania Telles, CNP, Nya Le, CNP, Kiah Ty PA-C, Alla Samuel, [facility], Julian Valles, CNP, Miguel Angel Morgan, CNP, Jalen Mai, CNP, Eugenie Owen, CNP, Kindra Alvarez, CNP, Gisselle Ayala, CNP, Tara Rodriguez, CNP, Walker López, [address]    HISTORY AND PHYSICAL EXAMINATION            Date:   10/2/2021  Patient name:  Norma Lozano  Date of admission:  10/2/2021 10:18 AM  MRN:   [id number]  Account:  [de-identified]  YOB: 1990  PCP:    Saray Salas  Room:   3463/0924-03  Code Status:    Full Code    Chief Complaint:     Chief Complaint   Patient presents with    Abdominal Pain     having surgery for incarcerated hernia       History Obtained From:     patient    History of Present Illness:     Norma Lozano is a 27 y.o. Non- / non  female who presents with Abdominal Pain (having surgery for incarcerated hernia)   and is admitted to the hospital for the management of HERNIA. 41-year-old female with morbid obesity, hypertension, hyperlipidemia, came in as a transfer from [place] for umbilical hernia with worsening abdominal pain. Patient also reports 1 episode of vomiting on Friday. No constipation. No nausea.   Patient states that the hernia was getting better but over the last 2 to 3 days the pain worsened and hernia would not decrease in size. Patient was in [place] peroneal was reduced. Patient was evaluated by general surgery in ER and recommended admission. Patient has significant history of morbid obesity BMI 66, states that she has been off all her medications for last 2 years. She has been trying to lose weight, is physically active and walks around 5 miles every day at work. No shortness of breath or chest pain. States that the pain is much better. Past Medical History:     Past Medical History:   Diagnosis Date    Anemia 2005    Depression 2016    GERD (gastroesophageal reflux disease)     Herpes simplex type 2 (HSV-2) infection affecting pregnancy, antepartum     Hyperlipidemia     Hypertension 2016    Hypothyroidism 1995    MVP (mitral valve prolapse)         Past Surgical History:     Past Surgical History:   Procedure Laterality Date    WISDOM TOOTH EXTRACTION          Medications Prior to Admission:     Prior to Admission medications    Medication Sig Start Date End Date Taking? Authorizing Provider   FLUoxetine (PROZAC) 20 MG capsule Take 40 mg by mouth daily   Yes Historical Provider, MD   melatonin 3 MG TABS tablet Take 10 mg by mouth nightly as needed   Yes Historical Provider, MD        Allergies:     Penicillins and Tape [adhesive tape]    Social History:     Tobacco:    reports that she quit smoking about 7 years ago. Her smoking use included cigarettes. She has never used smokeless tobacco.  Alcohol:      reports current alcohol use. Drug Use:  reports current drug use. Drug: Marijuana.     Family History:     Family History   Problem Relation Age of Onset    High Blood Pressure Father     Heart Disease Father     Emphysema Father     COPD Father     Elevated Lipids Father     Atrial Fibrillation Father     Diabetes Maternal Grandfather     Heart Disease Paternal Grandfather     Diabetes Paternal Grandfather     High Blood Pressure Paternal Grandfather     Emphysema Paternal Grandfather     COPD Paternal Grandfather     High Blood Pressure Brother     Heart Disease Paternal Grandmother     Thyroid Disease Mother        Review of Systems:     Positive and Negative as described in HPI. CONSTITUTIONAL: Morbidly obese   HEENT:  negative for vision, hearing changes, runny nose, throat pain  RESPIRATORY:  negative for shortness of breath, cough, congestion, wheezing  CARDIOVASCULAR:  negative for chest pain, palpitations  GASTROINTESTINAL: Positive for nausea, vomiting, no constipation or diarrhea, abdominal mass positive  GENITOURINARY:  negative for difficulty of urination, burning with urination, frequency   INTEGUMENT:  negative for rash, skin lesions, easy bruising   HEMATOLOGIC/LYMPHATIC:  negative for swelling/edema   ALLERGIC/IMMUNOLOGIC:  negative for urticaria , itching  ENDOCRINE:  negative increase in drinking, increase in urination, hot or cold intolerance  MUSCULOSKELETAL:  negative joint pains, muscle aches, swelling of joints  NEUROLOGICAL:  negative for headaches, dizziness, lightheadedness, numbness, pain, tingling extremities  BEHAVIOR/PSYCH:  negative for depression, anxiety    Physical Exam:   BP (!) 147/79   Pulse 70   Temp 98 °F (36.7 °C) (Oral)   Resp 22   Ht 5' 8\" (1.727 m)   Wt (!) 435 lb (197.3 kg)   SpO2 96%   BMI 66.14 kg/m²   Temp (24hrs), Av.2 °F (36.8 °C), Min:98 °F (36.7 °C), Max:98.5 °F (36.9 °C)    No results for input(s): POCGLU in the last 72 hours.   No intake or output data in the 24 hours ending 10/02/21 1590    General Appearance: alert, well appearing, and in no acute distress, morbidly obese  Mental status: oriented to person, place, and time  Head: normocephalic, atraumatic  Eye: no icterus, redness, pupils equal and reactive, extraocular eye movements intact, conjunctiva clear  Ear: normal external ear, no discharge, hearing intact  Nose: no drainage noted  Mouth: mucous membranes moist  Neck: supple, no carotid bruits, thyroid not palpable  Lungs: Bilateral equal air entry, clear to ausculation, no wheezing, rales or rhonchi, normal effort  Cardiovascular: normal rate, regular rhythm, no murmur, gallop, rub  Abdomen: Distended,umbilical hernia, tenderness +  Neurologic: There are no new focal motor or sensory deficits, normal muscle tone and bulk, no abnormal sensation, normal speech, cranial nerves II through XII grossly intact  Skin: No gross lesions, rashes, bruising or bleeding on exposed skin area  Extremities: peripheral pulses palpable, no pedal edema or calf pain with palpation  Psych: normal affect    Investigations:      Laboratory Testing:  Recent Results (from the past 24 hour(s))   CBC Auto Differential    Collection Time: 10/02/21  5:10 AM   Result Value Ref Range    WBC 8.7 3.5 - 11.3 k/uL    RBC 4.33 3.95 - 5.11 m/uL    Hemoglobin 11.8 (L) 11.9 - 15.1 g/dL    Hematocrit 39.0 36.3 - 47.1 %    MCV 90.1 82.6 - 102.9 fL    MCH 27.3 25.2 - 33.5 pg    MCHC 30.3 28.4 - 34.8 g/dL    RDW 13.3 11.8 - 14.4 %    Platelets 529 142 - 439 k/uL    MPV 10.4 8.1 - 13.5 fL    NRBC Automated 0.0 0.0 per 100 WBC    Differential Type NOT REPORTED     Seg Neutrophils 54 36 - 65 %    Lymphocytes 36 24 - 43 %    Monocytes 5 3 - 12 %    Eosinophils % 4 1 - 4 %    Basophils 1 0 - 2 %    Immature Granulocytes 0 0 %    Segs Absolute 4.75 1.50 - 8.10 k/uL    Absolute Lymph # 3.13 1.10 - 3.70 k/uL    Absolute Mono # 0.45 0.10 - 1.20 k/uL    Absolute Eos # 0.31 0.00 - 0.44 k/uL    Basophils Absolute 0.06 0.00 - 0.20 k/uL    Absolute Immature Granulocyte <0.03 0.00 - 0.30 k/uL    WBC Morphology NOT REPORTED     RBC Morphology NOT REPORTED     Platelet Estimate NOT REPORTED    Comprehensive Metabolic Panel    Collection Time: 10/02/21  5:10 AM   Result Value Ref Range    Glucose 93 70 - 99 mg/dL    BUN 8 6 - 20 mg/dL    CREATININE 0.71 0.50 - 0.90 mg/dL    Bun/Cre Ratio 11 9 - 20    Calcium 9.2 8.6 - 10.4 mg/dL    Sodium 136 135 - 144 mmol/L    Potassium 4.1 3.7 - 5.3 mmol/L    Chloride 100 98 - 107 mmol/L    CO2 24 20 - 31 mmol/L    Anion Gap 12 9 - 17 mmol/L    Alkaline Phosphatase 84 35 - 104 U/L    ALT 43 (H) 5 - 33 U/L    AST 37 (H) <32 U/L    Total Bilirubin 0.23 (L) 0.3 - 1.2 mg/dL    Total Protein 6.9 6.4 - 8.3 g/dL    Albumin 4.0 3.5 - 5.2 g/dL    Albumin/Globulin Ratio 1.4 1.0 - 2.5    GFR Non-African American >60 >60 mL/min    GFR African American >60 >60 mL/min    GFR Comment          GFR Staging         Lactic Acid, Plasma    Collection Time: 10/02/21  5:10 AM   Result Value Ref Range    Lactic Acid 1.2 0.5 - 2.2 mmol/L    Lactic Acid, Whole Blood NOT REPORTED 0.7 - 2.1 mmol/L   Lipase    Collection Time: 10/02/21  5:10 AM   Result Value Ref Range    Lipase 41 13 - 60 U/L   EKG 12 Lead    Collection Time: 10/02/21  2:00 PM   Result Value Ref Range    Ventricular Rate 71 BPM    Atrial Rate 71 BPM    P-R Interval 164 ms    QRS Duration 86 ms    Q-T Interval 402 ms    QTc Calculation (Bazett) 436 ms    P Axis 47 degrees    R Axis 26 degrees    T Axis 40 degrees       Imaging/Diagnostics:  CT ABDOMEN PELVIS W IV CONTRAST Additional Contrast? Oral    Result Date: 10/2/2021  1. Diastasis of the rectus muscles with a wide necked umbilical hernia. The sac measures about 5 cm in size and contains fat and fluid. 2. A 6.2 cm left adnexal mass likely left ovary. Please see follow-up recommendations below. 3. Hepatomegaly. No focal liver lesion. RECOMMENDATIONS: 6.2 cm benign appearing ovarian cyst.  Recommend follow-up pelvic US in 6-12 weeks.  Reference: Helyn Cough Radiol 6401;50:[id number]       Assessment :      Hospital Problems         Last Modified POA    * (Principal) Reducible umbilical hernia 36/4/0434 Yes    Morbid obesity with BMI of 60.0-69.9, adult ([address] 10/2/2021 Yes    HTN (hypertension) 10/2/2021 Yes    Mass of left ovary 10/2/2021 Yes    Sleep apnea 10/2/2021 Yes          Plan:     Patient status inpatient in the Med/Surge    Reducible umbilical hernia with rectus diastases-General surgery to monitor overnight, plan for surgery if pain worsens. Otherwise plan for discharge in the a.m. History of mitral prolapseobtain echo    Hypertensioncontinue lisinopril hydrochlorothiazide, patient has not taken her medication for last 3 years but is hypertensive with blood pressure above 150. Encourage compliance    Morbid obesitypatient trying to lose weight, ensure lifestyle modification    6.2 cm adnexal mass left ovarywill need outpatient ultrasound follow-up. Lovenox for dvt prop    Consultations:   IP CONSULT TO GENERAL SURGERY  IP CONSULT TO HOSPITALIST  IP CONSULT TO RESPIRATORY CARE     Patient is admitted as inpatient status because of co-morbidities listed above, severity of signs and symptoms as outlined, requirement for current medical therapies and most importantly because of direct risk to patient if care not provided in a hospital setting. Expected length of stay > 48 hours.     Sukhdeep Garrison MD  10/2/2021  5:43 PM    Copy sent to Dr. Sanjiv Brooks

## 2021-10-02 NOTE — ED NOTES
[facility] called to initiate transfer to [facility]. Dr Mohini Briceno has already spoken with Dr Peyton Rowe in the ER who has accepted the patient.  Access will page General Surgey and connect with Dr Cristina Gomez  10/02/21 4605

## 2021-10-03 VITALS
SYSTOLIC BLOOD PRESSURE: 136 MMHG | TEMPERATURE: 98.8 F | RESPIRATION RATE: 20 BRPM | HEART RATE: 71 BPM | DIASTOLIC BLOOD PRESSURE: 79 MMHG | HEIGHT: 68 IN | BODY MASS INDEX: 44.41 KG/M2 | WEIGHT: 293 LBS | OXYGEN SATURATION: 98 %

## 2021-10-03 LAB
ABSOLUTE EOS #: 0.3 K/UL (ref 0–0.44)
ABSOLUTE IMMATURE GRANULOCYTE: <0.03 K/UL (ref 0–0.3)
ABSOLUTE LYMPH #: 2.66 K/UL (ref 1.1–3.7)
ABSOLUTE MONO #: 0.4 K/UL (ref 0.1–1.2)
ALBUMIN SERPL-MCNC: 3.7 G/DL (ref 3.5–5.2)
ALBUMIN/GLOBULIN RATIO: 1.3 (ref 1–2.5)
ALP BLD-CCNC: 76 U/L (ref 35–104)
ALT SERPL-CCNC: 35 U/L (ref 5–33)
ANION GAP SERPL CALCULATED.3IONS-SCNC: 11 MMOL/L (ref 9–17)
AST SERPL-CCNC: 25 U/L
BASOPHILS # BLD: 1 % (ref 0–2)
BASOPHILS ABSOLUTE: 0.04 K/UL (ref 0–0.2)
BILIRUB SERPL-MCNC: 0.44 MG/DL (ref 0.3–1.2)
BILIRUBIN DIRECT: 0.15 MG/DL
BILIRUBIN, INDIRECT: 0.29 MG/DL (ref 0–1)
BUN BLDV-MCNC: 7 MG/DL (ref 6–20)
BUN/CREAT BLD: NORMAL (ref 9–20)
CALCIUM SERPL-MCNC: 8.8 MG/DL (ref 8.6–10.4)
CHLORIDE BLD-SCNC: 103 MMOL/L (ref 98–107)
CO2: 23 MMOL/L (ref 20–31)
CREAT SERPL-MCNC: 0.65 MG/DL (ref 0.5–0.9)
DIFFERENTIAL TYPE: ABNORMAL
EOSINOPHILS RELATIVE PERCENT: 4 % (ref 1–4)
GFR AFRICAN AMERICAN: >60 ML/MIN
GFR NON-AFRICAN AMERICAN: >60 ML/MIN
GFR SERPL CREATININE-BSD FRML MDRD: NORMAL ML/MIN/{1.73_M2}
GFR SERPL CREATININE-BSD FRML MDRD: NORMAL ML/MIN/{1.73_M2}
GLOBULIN: ABNORMAL G/DL (ref 1.5–3.8)
GLUCOSE BLD-MCNC: 87 MG/DL (ref 70–99)
HCT VFR BLD CALC: 38.3 % (ref 36.3–47.1)
HEMOGLOBIN: 11.1 G/DL (ref 11.9–15.1)
IMMATURE GRANULOCYTES: 0 %
LYMPHOCYTES # BLD: 39 % (ref 24–43)
MCH RBC QN AUTO: 26.6 PG (ref 25.2–33.5)
MCHC RBC AUTO-ENTMCNC: 29 G/DL (ref 28.4–34.8)
MCV RBC AUTO: 91.8 FL (ref 82.6–102.9)
MONOCYTES # BLD: 6 % (ref 3–12)
NRBC AUTOMATED: 0 PER 100 WBC
PDW BLD-RTO: 13.4 % (ref 11.8–14.4)
PLATELET # BLD: 319 K/UL (ref 138–453)
PLATELET ESTIMATE: ABNORMAL
PMV BLD AUTO: 11 FL (ref 8.1–13.5)
POTASSIUM SERPL-SCNC: 3.9 MMOL/L (ref 3.7–5.3)
RBC # BLD: 4.17 M/UL (ref 3.95–5.11)
RBC # BLD: ABNORMAL 10*6/UL
SEG NEUTROPHILS: 50 % (ref 36–65)
SEGMENTED NEUTROPHILS ABSOLUTE COUNT: 3.38 K/UL (ref 1.5–8.1)
SODIUM BLD-SCNC: 137 MMOL/L (ref 135–144)
TOTAL PROTEIN: 6.5 G/DL (ref 6.4–8.3)
WBC # BLD: 6.8 K/UL (ref 3.5–11.3)
WBC # BLD: ABNORMAL 10*3/UL

## 2021-10-03 PROCEDURE — 80048 BASIC METABOLIC PNL TOTAL CA: CPT

## 2021-10-03 PROCEDURE — 36415 COLL VENOUS BLD VENIPUNCTURE: CPT

## 2021-10-03 PROCEDURE — 2580000003 HC RX 258: Performed by: STUDENT IN AN ORGANIZED HEALTH CARE EDUCATION/TRAINING PROGRAM

## 2021-10-03 PROCEDURE — 80076 HEPATIC FUNCTION PANEL: CPT

## 2021-10-03 PROCEDURE — 99232 SBSQ HOSP IP/OBS MODERATE 35: CPT | Performed by: INTERNAL MEDICINE

## 2021-10-03 PROCEDURE — 85025 COMPLETE CBC W/AUTO DIFF WBC: CPT

## 2021-10-03 PROCEDURE — 6370000000 HC RX 637 (ALT 250 FOR IP): Performed by: INTERNAL MEDICINE

## 2021-10-03 RX ORDER — FLUOXETINE HYDROCHLORIDE 20 MG/1
40 CAPSULE ORAL DAILY
Status: DISCONTINUED | OUTPATIENT
Start: 2021-10-03 | End: 2021-10-03 | Stop reason: HOSPADM

## 2021-10-03 RX ADMIN — FLUOXETINE HYDROCHLORIDE 40 MG: 20 CAPSULE ORAL at 09:40

## 2021-10-03 RX ADMIN — SODIUM CHLORIDE, PRESERVATIVE FREE 10 ML: 5 INJECTION INTRAVENOUS at 09:41

## 2021-10-03 ASSESSMENT — PAIN SCALES - GENERAL: PAINLEVEL_OUTOF10: 0

## 2021-10-03 NOTE — PROGRESS NOTES
[facility]  Office: [address], DO, Akil Coats, DO, Papa Sanchez, DO, Kelsie Grady Blood, DO, Yovani Rankin MD, Tamica Ramon MD, Carie Moreno MD, Carlotta He MD, Norman Harrison MD, Imelda Hahn MD, Anup Gonzalez MD, Jens Franco, DO, Sophy Mcgill, DO, Magalie Foster MD,  Risa Mosqueda DO, Angelika Hernandez MD, Jose Francisco Bernal MD, Mj Sharp MD, Katherine Frank MD, , Laurita Gentile MD, Jessica Muniz MD, Chris Berkowitz MD, Chidi Gonzales, CNP, Luis E Harvey, CNP, Trevon Cuba, CNS, Anabel Almaraz, CNP, Armani Saxena, CNP, Bradley Young, CNP, Don Barnes, CNP, Justino Barger, CNP, Nikolas Steven PA-C, Raegan Raymond, [facility], Shauna Vogel, CNP, Skye Osorio, CNP, Susie Floyd, CNP, Hoda Paez, CNP, Tanglea Skinner, CNP, Yakov Herman, CNP, Kierra Butts, [facility], [place]ue Orlando, [address] [place] [address]    Progress Note    10/3/2021    9:54 AM    Name:   Grant Wong  MRN:     [id number]     Acct:      [de-identified]   Room:   [address] [place] [address] Day:  1  Admit Date:  10/2/2021 10:18 AM    PCP:   Vivian Salas  Code Status:  Full Code    Subjective:     C/C:   Chief Complaint   Patient presents with    Abdominal Pain     having surgery for incarcerated hernia     Interval History Status: improved. Patient is resting comfortably, abdominal pain is markedly improved. Denies any chest pain, shortness of breath, nausea vomiting, fevers or chills. Brief History: This is a 24-year-old female that was transferred to [place] [address] from [facility] where she was initially evaluated for abdominal pain was found to have umbilical hernia with possible incarceration. She was eval by surgery here and underwent reduction of the hernia with improvement in her symptoms. She was admitted for further evaluation and serial abdominal exams.   She remained pain-free after reduction of Grandfather     High Blood Pressure Brother     Heart Disease Paternal Grandmother     Thyroid Disease Mother        Vitals:  /79   Pulse 71   Temp 98.8 °F (37.1 °C) (Oral)   Resp 20   Ht 5' 8\" (1.727 m)   Wt (!) 435 lb (197.3 kg)   SpO2 98%   BMI 66.14 kg/m²   Temp (24hrs), Av.3 °F (36.8 °C), Min:98 °F (36.7 °C), Max:98.8 °F (37.1 °C)    No results for input(s): POCGLU in the last 72 hours. I/O (24Hr): No intake or output data in the 24 hours ending 10/03/21 0954    Labs:  Hematology:  Recent Labs     10/02/21  0510 10/03/21  06   WBC 8.7 6.8   RBC 4.33 4.17   HGB 11.8* 11.1*   HCT 39.0 38.3   MCV 90.1 91.8   MCH 27.3 26.6   MCHC 30.3 29.0   RDW 13.3 13.4    319   MPV 10.4 11.0     Chemistry:  Recent Labs     10/02/21  0510 10/03/21  0605    137   K 4.1 3.9    103   CO2 24 23   GLUCOSE 93 87   BUN 8 7   CREATININE 0.71 0.65   ANIONGAP 12 11   LABGLOM >60 >60   GFRAA >60 >60   CALCIUM 9.2 8.8   LACTACIDWB NOT REPORTED  --      Recent Labs     10/02/21  0510 10/03/21  0605   PROT 6.9 6.5   LABALBU 4.0 3.7   AST 37* 25   ALT 43* 35*   ALKPHOS 84 76   BILITOT 0.23* 0.44   BILIDIR  --  0.15   LIPASE 41  --      ABG:  Lab Results   Component Value Date    PHART 7.480 2014    ZHH4IZE 34.8 2014    PO2ART 51.5 2014    FAN9NUH 25.3 2014    NBEA NOT REPORTED 2014    PBEA 2.3 2014    E2NMWAGM 89.2 2014    FIO2 NOT REPORTED 2014     Lab Results   Component Value Date/Time    SPECIAL 5ML RAC 2020 06:45 PM     Lab Results   Component Value Date/Time    CULTURE (A) 2020 06:45 PM     POSITIVE BLOOD CULTURE, RN NOTIFIEDHimichele Alberto RN ED [phone] [address] RB    CULTURE DIRECT GRAM STAIN FROM BOTTLE: [address] NEGATIVE RODS 2020 06:45 PM    CULTURE ESCHERICHIA COLI (A) 2020 06:45 PM       Radiology:  CT ABDOMEN PELVIS W IV CONTRAST Additional Contrast? Oral    Result Date: 10/2/2021  1.  Diastasis of the rectus muscles with a wide necked umbilical hernia. The sac measures about 5 cm in size and contains fat and fluid. 2. A 6.2 cm left adnexal mass likely left ovary. Please see follow-up recommendations below. 3. Hepatomegaly. No focal liver lesion. RECOMMENDATIONS: 6.2 cm benign appearing ovarian cyst.  Recommend follow-up pelvic US in 6-12 weeks. Reference: J Am Arslan Radiol 2013;10:675-681       Physical Examination:        General appearance:  alert, cooperative and no distress  Mental Status:  oriented to person, place and time and normal affect  Lungs:  clear to auscultation bilaterally, normal effort  Heart:  regular rate and rhythm, no murmur  Abdomen:  soft, nontender, nondistended, normal bowel sounds, no masses, hepatomegaly, splenomegaly  Extremities:  no edema, redness, tenderness in the calves  Skin:  no gross lesions, rashes, induration    Assessment:        Hospital Problems         Last Modified POA    * (Principal) Reducible umbilical hernia 24/8/1255 Yes    Morbid obesity with BMI of 60.0-69.9, adult ([address] 10/2/2021 Yes    HTN (hypertension) 10/2/2021 Yes    Mass of left ovary 10/2/2021 Yes    Sleep apnea 10/2/2021 Yes          Plan:        1. Advance diet per surgery  2. Continue home antihypertensives  3. Low calorie diet, weight loss   4. GI and DVT prophylaxis  5. Continue home CPAP  6.  Home today if tolerating diet    Johnanna Shone, DO  10/3/2021  9:54 AM

## 2021-10-03 NOTE — DISCHARGE SUMMARY
abdominal pain was found to have umbilical hernia with possible incarceration. She was transferred here for surgical evaluation as it was felt that the hernia was beyond the scope of the surgery on [place]. Ultimately she underwent investigation here and surgical consultation of the hernia was reduced with improvement in her pain. She was watched overnight and her diet was advanced and tolerated and she has continued to do well. She was cleared for surgery for discharge with outpatient follow-up for possible hernia repair as an outpatient in the near future. Significant therapeutic interventions: As above    Significant Diagnostic Studies:   Labs / Micro:  CBC:   Lab Results   Component Value Date    WBC 6.8 10/03/2021    RBC 4.17 10/03/2021    RBC 4.52 09/14/2017    HGB 11.1 10/03/2021    HCT 38.3 10/03/2021    MCV 91.8 10/03/2021    MCH 26.6 10/03/2021    MCHC 29.0 10/03/2021    RDW 13.4 10/03/2021     10/03/2021     BMP:    Lab Results   Component Value Date    GLUCOSE 87 10/03/2021    GLUCOSE 75 09/14/2017     10/03/2021    K 3.9 10/03/2021     10/03/2021    CO2 23 10/03/2021    ANIONGAP 11 10/03/2021    BUN 7 10/03/2021    CREATININE 0.65 10/03/2021    BUNCRER NOT REPORTED 10/03/2021    CALCIUM 8.8 10/03/2021    LABGLOM >60 10/03/2021    GFRAA >60 10/03/2021    GFR      10/03/2021    GFR NOT REPORTED 10/03/2021        Radiology:  CT ABDOMEN PELVIS W IV CONTRAST Additional Contrast? Oral    Result Date: 10/2/2021  1. Diastasis of the rectus muscles with a wide necked umbilical hernia. The sac measures about 5 cm in size and contains fat and fluid. 2. A 6.2 cm left adnexal mass likely left ovary. Please see follow-up recommendations below. 3. Hepatomegaly. No focal liver lesion. RECOMMENDATIONS: 6.2 cm benign appearing ovarian cyst.  Recommend follow-up pelvic US in 6-12 weeks.  Reference: Irina Rochepeña Radiol 0751;06:149-306       Consultations:    Consults:     Final Specialist Recommendations/Findings:   IP CONSULT TO GENERAL SURGERY  IP CONSULT TO HOSPITALIST  IP CONSULT TO RESPIRATORY CARE      The patient was seen and examined on day of discharge and this discharge summary is in conjunction with any daily progress note from day of discharge. Discharge plan:     Disposition: Home    Physician Follow Up:   PCP 1 week  No follow-up provider specified. Requiring Further Evaluation/Follow Up POST HOSPITALIZATION/Incidental Findings: None    Diet: regular diet    Activity: As tolerated    Instructions to Patient: Take medication as prescribed    Discharge Medications:      Medication List      CONTINUE taking these medications    FLUoxetine 20 MG capsule  Commonly known as: PROZAC     melatonin 3 MG Tabs tablet        STOP taking these medications    ondansetron 4 MG disintegrating tablet  Commonly known as: Zofran ODT            No discharge procedures on file. Time Spent on discharge is  27 minutes in patient examination, evaluation, counseling as well as medication reconciliation, prescriptions for required medications, discharge plan and follow up. Electronically signed by   Matt Maldonado DO  10/3/2021  10:37 AM      Thank you Dr. Leila Brito for the opportunity to be involved in this patient's care.

## 2021-10-03 NOTE — CARE COORDINATION
Case Management Initial Discharge Plan  Antwan Girard,             Met with:patient to discuss discharge plans. Information verified: address, contacts, phone number, , insurance Yes  Insurance Provider: Bob Lyles    Emergency Contact/Next of Kin name & number: Patient's spouse Andry Mcguire ([phone])  Who are involved in patient's support system? Spouse, friends and family    PCP: Carlo Salas  Date of last visit: Virtual visit       Discharge Planning    Living Arrangements:  Spouse/Significant Other  And their Girlfriend    Home has 2 stories  4 stairs to climb to get into front door, 13 stairs to climb to reach second floor  Location of bedroom/bathroom in home Upstairs    Patient able to perform ADL's:Independent    Current Services (outpatient & in home) None  DME equipment: C-Pap  DME provider: N/A    Is patient receiving oral anticoagulation therapy? No    If indicated:   Physician managing anticoagulation treatment: N/A  Where does patient obtain lab work for ATC treatment? N/A      Potential Assistance Needed:  N/A    Patient agreeable to home care: Yes  Freedom of choice provided:  n/a    Prior SNF/Rehab Placement and Facility: None  Agreeable to SNF/Rehab: Yes  [place] of choice provided: n/a     Evaluation: no    Expected Discharge date:  10/05/21    Patient expects to be discharged to:    Home    If home: is the family and/or caregiver wiling & able to provide support at home? Yes  Who will be providing this support? Patient's spouse and GF    Follow Up Appointment: Best Day/ Time: Monday PM    Transportation provider: Family  Transportation arrangements needed for discharge: No    Readmission Risk              Risk of Unplanned Readmission:  10             Does patient have a readmission risk score greater than 14?: No  If yes, follow-up appointment must be made within 7 days of discharge.      Goals of Care: Safety      Educated patient on transitional options, provided freedom of choice and are agreeable with plan      Discharge Plan: Home with family, has ride and PCP          Electronically signed by Yoli Renae RN on 10/3/21 at 11:16 AM EDT

## 2021-10-03 NOTE — PROGRESS NOTES
PATIENT REFUSES TO WEAR BIPAP     [x] Risks and benefits explained to patient   [x] Patient refuses to wear Bipap stating I stopped wearing it at home and haven't had any issues. [x] Patient verbalizes understanding of information presented.

## 2021-10-03 NOTE — PROGRESS NOTES
General Surgery:  Daily Progress Note         PATIENT NAME: Johnathan Brown     TODAY'S DATE: 10/3/2021, 7:21 AM    SUBJECTIVE:     Pt seen and examined at bedside. No acute events overnight. Afebrile. Pt states overall pain is improved. C/o some tenderness near umbilicus.    Denies fever, chills, nausea, vomiting, chest pain, and SOB.     +flatus    OBJECTIVE:   VITALS:  BP (!) 140/89   Pulse 72   Temp 98.3 °F (36.8 °C) (Oral)   Resp 20   Ht 5' 8\" (1.727 m)   Wt (!) 435 lb (197.3 kg)   SpO2 100%   BMI 66.14 kg/m²      INTAKE/OUTPUT:    No intake or output data in the 24 hours ending 10/03/21 0721    PHYSICAL EXAM:  General Appearance:  awake, alert, oriented, in no acute distress  Lungs:  No inc WOB  Heart:  Heart regular rate and rhythm  Abdomen:  soft, obese, minimally tender at ventral hernia site, no guarding, non peritoneal     Data:  CBC with Differential:    Lab Results   Component Value Date    WBC 6.8 10/03/2021    RBC 4.17 10/03/2021    RBC 4.52 09/14/2017    HGB 11.1 10/03/2021    HCT 38.3 10/03/2021     10/03/2021    MCV 91.8 10/03/2021    MCH 26.6 10/03/2021    MCHC 29.0 10/03/2021    RDW 13.4 10/03/2021    LYMPHOPCT 39 10/03/2021    LYMPHOPCT 25.4 09/14/2017    MONOPCT 6 10/03/2021    EOSPCT 2.4 09/14/2017    BASOPCT 1 10/03/2021    BASOPCT 0.8 09/14/2017    MONOSABS 0.40 10/03/2021    LYMPHSABS 2.66 10/03/2021    EOSABS 0.30 10/03/2021    BASOSABS 0.04 10/03/2021    DIFFTYPE NOT REPORTED 10/03/2021     BMP:    Lab Results   Component Value Date     10/03/2021    K 3.9 10/03/2021     10/03/2021    CO2 23 10/03/2021    BUN 7 10/03/2021    LABALBU 3.7 10/03/2021    CREATININE 0.65 10/03/2021    CALCIUM 8.8 10/03/2021    GFRAA >60 10/03/2021    LABGLOM >60 10/03/2021    GLUCOSE 87 10/03/2021    GLUCOSE 75 09/14/2017       Radiology Review:  No new    ASSESSMENT:  Active Hospital Problems    Diagnosis Date Noted    Reducible umbilical hernia [J11.8] 10/02/2021    Mass of left ovary [N83.8] 10/02/2021    Sleep apnea [G47.30] 10/02/2021    Morbid obesity with BMI of 60.0-69.9, adult [facility] [E66.01, Z68.44] 01/17/2014    HTN (hypertension) Valeria Milder 01/17/2014       27 y.o. female with morbid obesity ,reducible umbilical hernia with rectus diastases, mitral valve prolapse, smoking, marijuana abuse    Plan:  Medical management per primary team  Ok for diet   Morning labs wnl  Plan for outpatient repair of ventral hernia. [facility]. Patient has improved pain and tolerating diet. She has claimed that she is going to stop smoking to improve her chances at a successful hernia repair. [id number] for DC from surgical standpoint    Electronically signed by Jodee Harper DO  on 10/3/2021 at 7:21 AM    Attending Physician Statement  I have discussed the case with Dr Kelli Vega, including pertinent history and exam findings with the resident. I have seen and examined the patient and the key elements of the encounter have been performed by me. I agree with the assessment, plan and orders as documented by the resident.       Electronically signed by Jennifer Xie IV, DO  on 10/3/2021 at 2:20 PM

## 2021-10-03 NOTE — PLAN OF CARE
Problem: Falls - Risk of:  Goal: Will remain free from falls  Description: Will remain free from falls  10/3/2021 0237 by Drake Alcaraz RN  Outcome: Met This Shift  10/2/2021 1508 by Jitendra Cheney RN  Outcome: Ongoing  Goal: Absence of physical injury  Description: Absence of physical injury  10/3/2021 0237 by Drake Alcaraz RN  Outcome: Met This Shift  10/2/2021 1508 by Jitendra Cheney RN  Outcome: Ongoing     Problem: Preoperative Routine:  Goal: Risk for injury before, during, or after surgery or procedure will decrease  Description: Risk for injury before, during, or after surgery or procedure will decrease  10/3/2021 0237 by Drake Alcaraz RN  Outcome: Met This Shift  10/2/2021 1508 by Jitendra Cheney RN  Outcome: Ongoing     Problem: Anxiety:  Goal: Ability to modify response to factors that promote anxiety will improve  Description: Ability to modify response to factors that promote anxiety will improve  10/3/2021 0237 by Drake Alcaraz RN  Outcome: Met This Shift  10/2/2021 1508 by Jitendra Cheney RN  Outcome: Ongoing  Goal: Level of anxiety will decrease  Description: Level of anxiety will decrease  10/3/2021 0237 by Drake Alcaraz RN  Outcome: Met This Shift  10/2/2021 1508 by Jitendra Cheney RN  Outcome: Ongoing     Problem: Gas Exchange - Impaired:  Goal: Levels of oxygenation will improve  Description: Levels of oxygenation will improve  10/3/2021 0237 by Drake Alcaraz RN  Outcome: Met This Shift  10/2/2021 1508 by Jitendar Cheney RN  Outcome: Ongoing     Problem: Pain - Acute:  Goal: Recognizes and communicates pain  Description: Recognizes and communicates pain  10/3/2021 0237 by Drake Alcaraz RN  Outcome: Met This Shift  10/2/2021 1508 by Jitendra Cheney RN  Outcome: Ongoing     Problem: Tobacco Use:  Goal: Will participate in inpatient tobacco-use cessation counseling  Description: Will participate in inpatient tobacco-use cessation counseling  10/3/2021 0237 by Zuleima Angulo, RN  Outcome: Met This Shift  10/2/2021 1508 by Stefano Helms RN  Outcome: Ongoing

## 2021-10-03 NOTE — CARE COORDINATION
Discharge 1 [facility]  Written by: Aissatou Hernandez RN    Patient Name: Anh Trujillo  Attending Provider: Ernestine Solis DO  Admit Date: 10/2/2021 10:18 AM  MRN: [id number]  Account: [de-identified]                     : 1990  Discharge Date: 10/03/2021      Disposition: home with family, has ride and PCP    Aissatou Hernandez RN

## 2021-10-03 NOTE — PLAN OF CARE
Problem: Falls - Risk of:  Goal: Will remain free from falls  Description: Will remain free from falls  10/3/2021 1142 by Valeria Aldrich RN  Outcome: Completed  10/3/2021 0237 by Mukund Polk RN  Outcome: Met This Shift  Goal: Absence of physical injury  Description: Absence of physical injury  10/3/2021 1142 by Valeria Aldrich RN  Outcome: Completed  10/3/2021 0237 by Mukund Polk RN  Outcome: Met This Shift     Problem: Preoperative Routine:  Goal: Risk for injury before, during, or after surgery or procedure will decrease  Description: Risk for injury before, during, or after surgery or procedure will decrease  10/3/2021 1142 by Valeria Aldrich RN  Outcome: Completed  10/3/2021 0237 by Mukund Polk RN  Outcome: Met This Shift     Problem: Anxiety:  Goal: Ability to modify response to factors that promote anxiety will improve  Description: Ability to modify response to factors that promote anxiety will improve  10/3/2021 1142 by Valeria Aldrich RN  Outcome: Completed  10/3/2021 0237 by Mukund Polk RN  Outcome: Met This Shift  Goal: Level of anxiety will decrease  Description: Level of anxiety will decrease  10/3/2021 1142 by Valeria Aldrich RN  Outcome: Completed  10/3/2021 0237 by Mukund Polk RN  Outcome: Met This Shift     Problem: Gas Exchange - Impaired:  Goal: Levels of oxygenation will improve  Description: Levels of oxygenation will improve  10/3/2021 1142 by Valeria Aldrich RN  Outcome: Completed  10/3/2021 0237 by Mukund Polk RN  Outcome: Met This Shift     Problem: Pain - Acute:  Goal: Recognizes and communicates pain  Description: Recognizes and communicates pain  10/3/2021 1142 by Valeria Aldrich RN  Outcome: Completed  10/3/2021 0237 by Mukund Polk RN  Outcome: Met This Shift     Problem: Tobacco Use:  Goal: Will participate in inpatient tobacco-use cessation counseling  Description: Will participate in inpatient tobacco-use cessation

## 2021-10-06 ENCOUNTER — OFFICE VISIT (OUTPATIENT)
Dept: SURGERY | Age: 31
End: 2021-10-06
Payer: COMMERCIAL

## 2021-10-06 VITALS
WEIGHT: 293 LBS | DIASTOLIC BLOOD PRESSURE: 90 MMHG | BODY MASS INDEX: 44.41 KG/M2 | HEIGHT: 68 IN | SYSTOLIC BLOOD PRESSURE: 126 MMHG

## 2021-10-06 DIAGNOSIS — K42.9 REDUCIBLE UMBILICAL HERNIA: Primary | ICD-10-CM

## 2021-10-06 DIAGNOSIS — E66.01 MORBID OBESITY WITH BMI OF 60.0-69.9, ADULT (HCC): ICD-10-CM

## 2021-10-06 PROCEDURE — 99213 OFFICE O/P EST LOW 20 MIN: CPT | Performed by: SURGERY

## 2021-10-06 NOTE — PROGRESS NOTES
this visit. ALLERGIES:  Allergies   Allergen Reactions    Penicillins Hives    Tape Zahraa Kalyan Tape] Rash       FAMHX:  Family History   Problem Relation Age of Onset    High Blood Pressure Father     Heart Disease Father     Emphysema Father     COPD Father     Elevated Lipids Father     Atrial Fibrillation Father     Diabetes Maternal Grandfather     Heart Disease Paternal Grandfather     Diabetes Paternal Grandfather     High Blood Pressure Paternal Grandfather     Emphysema Paternal Grandfather     COPD Paternal Grandfather     High Blood Pressure Brother     Heart Disease Paternal Grandmother     Thyroid Disease Mother        SOCHX:  Social History     Socioeconomic History    Marital status:      Spouse name: Not on file    Number of children: Not on file    Years of education: Not on file    Highest education level: Not on file   Occupational History    Not on file   Tobacco Use    Smoking status: Former Smoker     Types: Cigarettes     Quit date: 2014     Years since quittin.0    Smokeless tobacco: Never Used   Vaping Use    Vaping Use: Every day    Substances: Nicotine   Substance and Sexual Activity    Alcohol use: Yes     Comment: rare    Drug use: Yes     Types: Marijuana     Comment: daily    Sexual activity: Yes     Partners: Male   Other Topics Concern    Not on file   Social History Narrative    Not on file     Social Determinants of Health     Financial Resource Strain:     Difficulty of Paying Living Expenses:    Food Insecurity:     Worried About Running Out of Food in the Last Year:     Ran Out of Food in the Last Year:    Transportation Needs:     Lack of Transportation (Medical):      Lack of Transportation (Non-Medical):    Physical Activity:     Days of Exercise per Week:     Minutes of Exercise per Session:    Stress:     Feeling of Stress :    Social Connections:     Frequency of Communication with Friends and Family:     Frequency of Social Gatherings with Friends and Family:     Attends Jehovah's witness Services:     Active Member of Clubs or Organizations:     Attends Club or Organization Meetings:     Marital Status:    Intimate Partner Violence:     Fear of Current or Ex-Partner:     Emotionally Abused:     Physically Abused:     Sexually Abused:        ROS:  CONSTITUTIONAL: Denies weight loss, fever and chills  HEENT: Denies changes in vision and hearing  RESP: Denies SOB and cough  CV: Denies palpitations and CP  GI: Denies abdominal pain, nausea, vomiting and diarrhea  : Denies dysuria and urinary frequency  MSK: Denies myalgia and joint pain  SKIN: Denies rash and pruritus  NEURO: Denies headache and syncope  PSYCH: Denies recent changes in mood. Denies anxiety and depression    PHYSICAL EXAM:  Gen: No acute distress. A&Ox3. HEENT:  NC/AT. Conjunctiva moist without icterus. Ears are symmetric. Nares are patent. Oral mucus membranes are moist.  Neck:  Supple. No LAD. CV:  Regular rate and rhythm. Warm, well perfused. Resp:  Equal chest rise bilaterally. No wheezes, stridor, or increased work of breathing. Abd:  Morbidly obese, Mildly tender reducible umbilical hernia. Non-distended, no evidence of rebound. Neuro: Motor and sensory grossly intact. Ext:  Warm, dry, and well perfused. Limbs without apparent deformity. Skin:  No rashes or lesions. IMAGING:  No results found. LABS:  Lab Results   Component Value Date    WBC 6.8 10/03/2021    HGB 11.1 10/03/2021    HCT 38.3 10/03/2021     10/03/2021     10/03/2021    K 3.9 10/03/2021     10/03/2021    CO2 23 10/03/2021    BUN 7 10/03/2021    LABALBU 3.7 10/03/2021    CREATININE 0.65 10/03/2021    CALCIUM 8.8 10/03/2021    GFRAA >60 10/03/2021    LABGLOM >60 10/03/2021       ASSESSMENT:   Diagnosis Orders   1. Reducible umbilical hernia     2.  Morbid obesity with BMI of 60.0-69.9, adult (HCC)         PLAN:  -Based on cedar score, patient has

## 2021-10-07 LAB
EKG ATRIAL RATE: 71 BPM
EKG P AXIS: 47 DEGREES
EKG P-R INTERVAL: 164 MS
EKG Q-T INTERVAL: 402 MS
EKG QRS DURATION: 86 MS
EKG QTC CALCULATION (BAZETT): 436 MS
EKG R AXIS: 26 DEGREES
EKG T AXIS: 40 DEGREES
EKG VENTRICULAR RATE: 71 BPM

## 2021-12-21 ENCOUNTER — APPOINTMENT (OUTPATIENT)
Dept: CT IMAGING | Age: 31
End: 2021-12-21
Payer: COMMERCIAL

## 2021-12-21 ENCOUNTER — HOSPITAL ENCOUNTER (EMERGENCY)
Age: 31
Discharge: HOME | End: 2021-12-21
Payer: COMMERCIAL

## 2021-12-21 VITALS
DIASTOLIC BLOOD PRESSURE: 25 MMHG | SYSTOLIC BLOOD PRESSURE: 146 MMHG | RESPIRATION RATE: 18 BRPM | HEART RATE: 64 BPM | TEMPERATURE: 98.9 F | OXYGEN SATURATION: 98 %

## 2021-12-21 DIAGNOSIS — N30.01 ACUTE CYSTITIS WITH HEMATURIA: ICD-10-CM

## 2021-12-21 DIAGNOSIS — N13.0 ACQUIRED HYDRONEPHROSIS WITH URETEROPELVIC JUNCTION (UPJ) OBSTRUCTION: Primary | ICD-10-CM

## 2021-12-21 LAB
-: ABNORMAL
ABSOLUTE EOS #: 0.3 K/UL (ref 0–0.44)
ABSOLUTE IMMATURE GRANULOCYTE: 0.05 K/UL (ref 0–0.3)
ABSOLUTE LYMPH #: 2.63 K/UL (ref 1.1–3.7)
ABSOLUTE MONO #: 0.48 K/UL (ref 0.1–1.2)
ALBUMIN SERPL-MCNC: 4.5 G/DL (ref 3.5–5.2)
ALBUMIN/GLOBULIN RATIO: 1.3 (ref 1–2.5)
ALP BLD-CCNC: 107 U/L (ref 35–104)
ALT SERPL-CCNC: 22 U/L (ref 5–33)
AMORPHOUS: ABNORMAL
ANION GAP SERPL CALCULATED.3IONS-SCNC: 14 MMOL/L (ref 9–17)
AST SERPL-CCNC: 19 U/L
BACTERIA: ABNORMAL
BASOPHILS # BLD: 1 % (ref 0–2)
BASOPHILS ABSOLUTE: 0.08 K/UL (ref 0–0.2)
BILIRUB SERPL-MCNC: 0.26 MG/DL (ref 0.3–1.2)
BILIRUBIN URINE: NEGATIVE
BUN BLDV-MCNC: 10 MG/DL (ref 6–20)
BUN/CREAT BLD: 13 (ref 9–20)
CALCIUM SERPL-MCNC: 9.9 MG/DL (ref 8.6–10.4)
CASTS UA: ABNORMAL /LPF
CHLORIDE BLD-SCNC: 98 MMOL/L (ref 98–107)
CO2: 24 MMOL/L (ref 20–31)
COLOR: YELLOW
COMMENT UA: ABNORMAL
CREAT SERPL-MCNC: 0.76 MG/DL (ref 0.5–0.9)
CRYSTALS, UA: ABNORMAL /HPF
DIFFERENTIAL TYPE: ABNORMAL
EOSINOPHILS RELATIVE PERCENT: 3 % (ref 1–4)
EPITHELIAL CELLS UA: ABNORMAL /HPF (ref 0–25)
GFR AFRICAN AMERICAN: >60 ML/MIN
GFR NON-AFRICAN AMERICAN: >60 ML/MIN
GFR SERPL CREATININE-BSD FRML MDRD: ABNORMAL ML/MIN/{1.73_M2}
GFR SERPL CREATININE-BSD FRML MDRD: ABNORMAL ML/MIN/{1.73_M2}
GLUCOSE BLD-MCNC: 98 MG/DL (ref 70–99)
GLUCOSE URINE: NEGATIVE
HCG(URINE) PREGNANCY TEST: NEGATIVE
HCT VFR BLD CALC: 39.3 % (ref 36.3–47.1)
HEMOGLOBIN: 12.3 G/DL (ref 11.9–15.1)
IMMATURE GRANULOCYTES: 0 %
KETONES, URINE: NEGATIVE
LEUKOCYTE ESTERASE, URINE: ABNORMAL
LYMPHOCYTES # BLD: 22 % (ref 24–43)
MCH RBC QN AUTO: 27.4 PG (ref 25.2–33.5)
MCHC RBC AUTO-ENTMCNC: 31.3 G/DL (ref 28.4–34.8)
MCV RBC AUTO: 87.5 FL (ref 82.6–102.9)
MONOCYTES # BLD: 4 % (ref 3–12)
MUCUS: ABNORMAL
NITRITE, URINE: POSITIVE
NRBC AUTOMATED: 0 PER 100 WBC
OTHER OBSERVATIONS UA: ABNORMAL
PDW BLD-RTO: 13.4 % (ref 11.8–14.4)
PH UA: 6 (ref 5–9)
PLATELET # BLD: 369 K/UL (ref 138–453)
PLATELET ESTIMATE: ABNORMAL
PMV BLD AUTO: 10.2 FL (ref 8.1–13.5)
POTASSIUM SERPL-SCNC: 3.9 MMOL/L (ref 3.7–5.3)
PROTEIN UA: ABNORMAL
RBC # BLD: 4.49 M/UL (ref 3.95–5.11)
RBC # BLD: ABNORMAL 10*6/UL
RBC UA: ABNORMAL /HPF (ref 0–2)
RENAL EPITHELIAL, UA: ABNORMAL /HPF
SEG NEUTROPHILS: 70 % (ref 36–65)
SEGMENTED NEUTROPHILS ABSOLUTE COUNT: 8.33 K/UL (ref 1.5–8.1)
SODIUM BLD-SCNC: 136 MMOL/L (ref 135–144)
SPECIFIC GRAVITY UA: >1.03 (ref 1.01–1.02)
TOTAL PROTEIN: 8 G/DL (ref 6.4–8.3)
TRICHOMONAS: ABNORMAL
TURBIDITY: ABNORMAL
URINE HGB: ABNORMAL
UROBILINOGEN, URINE: NORMAL
WBC # BLD: 11.9 K/UL (ref 3.5–11.3)
WBC # BLD: ABNORMAL 10*3/UL
WBC UA: ABNORMAL /HPF (ref 0–5)
YEAST: ABNORMAL

## 2021-12-21 PROCEDURE — 85025 COMPLETE CBC W/AUTO DIFF WBC: CPT

## 2021-12-21 PROCEDURE — 6360000002 HC RX W HCPCS: Performed by: PHYSICIAN ASSISTANT

## 2021-12-21 PROCEDURE — 36415 COLL VENOUS BLD VENIPUNCTURE: CPT

## 2021-12-21 PROCEDURE — 80053 COMPREHEN METABOLIC PANEL: CPT

## 2021-12-21 PROCEDURE — 6370000000 HC RX 637 (ALT 250 FOR IP): Performed by: PHYSICIAN ASSISTANT

## 2021-12-21 PROCEDURE — 2580000003 HC RX 258: Performed by: PHYSICIAN ASSISTANT

## 2021-12-21 PROCEDURE — 81001 URINALYSIS AUTO W/SCOPE: CPT

## 2021-12-21 PROCEDURE — 99282 EMERGENCY DEPT VISIT SF MDM: CPT

## 2021-12-21 PROCEDURE — 96365 THER/PROPH/DIAG IV INF INIT: CPT

## 2021-12-21 PROCEDURE — 96375 TX/PRO/DX INJ NEW DRUG ADDON: CPT

## 2021-12-21 PROCEDURE — 81025 URINE PREGNANCY TEST: CPT

## 2021-12-21 PROCEDURE — 74176 CT ABD & PELVIS W/O CONTRAST: CPT

## 2021-12-21 RX ORDER — 0.9 % SODIUM CHLORIDE 0.9 %
1000 INTRAVENOUS SOLUTION INTRAVENOUS ONCE
Status: COMPLETED | OUTPATIENT
Start: 2021-12-21 | End: 2021-12-21

## 2021-12-21 RX ORDER — CEPHALEXIN 500 MG/1
500 CAPSULE ORAL 2 TIMES DAILY
Qty: 20 CAPSULE | Refills: 0 | Status: SHIPPED | OUTPATIENT
Start: 2021-12-21 | End: 2021-12-31

## 2021-12-21 RX ORDER — KETOROLAC TROMETHAMINE 30 MG/ML
30 INJECTION, SOLUTION INTRAMUSCULAR; INTRAVENOUS ONCE
Status: COMPLETED | OUTPATIENT
Start: 2021-12-21 | End: 2021-12-21

## 2021-12-21 RX ORDER — TAMSULOSIN HYDROCHLORIDE 0.4 MG/1
0.4 CAPSULE ORAL DAILY
Qty: 3 CAPSULE | Refills: 0 | Status: SHIPPED | OUTPATIENT
Start: 2021-12-21 | End: 2021-12-24

## 2021-12-21 RX ORDER — ONDANSETRON 2 MG/ML
4 INJECTION INTRAMUSCULAR; INTRAVENOUS ONCE
Status: COMPLETED | OUTPATIENT
Start: 2021-12-21 | End: 2021-12-21

## 2021-12-21 RX ORDER — ONDANSETRON 4 MG/1
4 TABLET, FILM COATED ORAL EVERY 8 HOURS PRN
Qty: 12 TABLET | Refills: 0 | Status: SHIPPED | OUTPATIENT
Start: 2021-12-21 | End: 2021-12-24

## 2021-12-21 RX ADMIN — KETOROLAC TROMETHAMINE 30 MG: 30 INJECTION, SOLUTION INTRAMUSCULAR; INTRAVENOUS at 18:54

## 2021-12-21 RX ADMIN — ONDANSETRON 4 MG: 2 INJECTION INTRAMUSCULAR; INTRAVENOUS at 18:53

## 2021-12-21 RX ADMIN — CEFTRIAXONE 1000 MG: 1 INJECTION, POWDER, FOR SOLUTION INTRAMUSCULAR; INTRAVENOUS at 20:41

## 2021-12-21 RX ADMIN — SODIUM CHLORIDE 1000 ML: 9 INJECTION, SOLUTION INTRAVENOUS at 19:01

## 2021-12-21 RX ADMIN — Medication: at 21:30

## 2021-12-21 ASSESSMENT — ENCOUNTER SYMPTOMS
EYES NEGATIVE: 1
NAUSEA: 1
RESPIRATORY NEGATIVE: 1
ABDOMINAL PAIN: 1

## 2021-12-21 ASSESSMENT — PAIN SCALES - GENERAL
PAINLEVEL_OUTOF10: 8
PAINLEVEL_OUTOF10: 9
PAINLEVEL_OUTOF10: 1

## 2021-12-21 ASSESSMENT — PAIN DESCRIPTION - ORIENTATION: ORIENTATION: LEFT

## 2021-12-21 ASSESSMENT — PAIN DESCRIPTION - PAIN TYPE: TYPE: ACUTE PAIN

## 2021-12-21 ASSESSMENT — PAIN DESCRIPTION - LOCATION: LOCATION: FLANK

## 2021-12-22 NOTE — ED PROVIDER NOTES
[facility] ED  EMERGENCY DEPARTMENT ENCOUNTER      Pt Name: Jenni Strauss  MRN: [id number]  [place] 1990  Date of evaluation: 12/21/2021  Provider: GARCÍA Soni PA-C    CHIEF COMPLAINT     Chief Complaint   Patient presents with    Abdominal Pain     left side back pain radiating to front.  Emesis     x2 on the way here. HISTORY OF PRESENT ILLNESS   (Location/Symptom, Timing/Onset, Context/Setting,Quality, Duration, Modifying Factors, Severity)  Note limiting factors. Jenni Strauss is a34 y.o. female who presents to the emergency department      59-year-old female presented here chief complaint of left flank pain. Patient states she developed left flank pain followed by nausea vomiting prior to arrival.  Denies known history of kidney stones. She states she is prone to urinary tract infections. Denies any other symptoms. Denies fevers or chills. States symptoms began just prior to arrival          Nursing Notes werereviewed. REVIEW OF SYSTEMS    (2-9 systems for level 4, 10 or more for level 5)     Review of Systems   Constitutional: Negative. HENT: Negative. Eyes: Negative. Respiratory: Negative. Cardiovascular: Negative. Gastrointestinal: Positive for abdominal pain and nausea. Endocrine: Negative. Genitourinary: Positive for flank pain. Neurological: Negative. Psychiatric/Behavioral: Negative. All other systems reviewed and are negative. Except as noted above the remainder of the review of systems was reviewed and negative.        PAST MEDICAL HISTORY     Past Medical History:   Diagnosis Date    Anemia 2005    Depression 2016    GERD (gastroesophageal reflux disease)     Herpes simplex type 2 (HSV-2) infection affecting pregnancy, antepartum     Hyperlipidemia     Hypertension 2016    Hypothyroidism 1995    MVP (mitral valve prolapse)          SURGICALHISTORY       Past Surgical History:   Procedure Laterality Date    Transportation (Non-Medical): Not on file   Physical Activity:     Days of Exercise per Week: Not on file    Minutes of Exercise per Session: Not on file   Stress:     Feeling of Stress : Not on file   Social Connections:     Frequency of Communication with Friends and Family: Not on file    Frequency of Social Gatherings with Friends and Family: Not on file    Attends Anabaptist Services: Not on file    Active Member of [address] [place] [address] or Organizations: Not on file    Attends Club or Organization Meetings: Not on file    Marital Status: Not on file   Intimate Partner Violence:     Fear of Current or Ex-Partner: Not on file    Emotionally Abused: Not on file    Physically Abused: Not on file    Sexually Abused: Not on file   Housing Stability:     Unable to Pay for Housing in the Last Year: Not on file    Number of [place] in the Last Year: Not on file    Unstable Housing in the Last Year: Not on file       SCREENINGS             PHYSICAL EXAM    (up to 7 for level 4, 8 or more for level 5)     ED Triage Vitals [12/21/21 1825]   BP Temp Temp Source Pulse Resp SpO2 Height Weight   (!) 185/95 98.9 °F (37.2 °C) Tympanic 64 18 96 % -- --       Physical Exam  Vitals and nursing note reviewed. Constitutional:       General: She is not in acute distress. Appearance: Normal appearance. She is obese. She is not ill-appearing or toxic-appearing. HENT:      Head: Normocephalic and atraumatic. Mouth/Throat:      Mouth: Mucous membranes are moist.   Eyes:      Pupils: Pupils are equal, round, and reactive to light. Cardiovascular:      Rate and Rhythm: Normal rate. Pulses: Normal pulses. Pulmonary:      Effort: Pulmonary effort is normal. No respiratory distress. Breath sounds: Normal breath sounds. No stridor. No wheezing, rhonchi or rales. Abdominal:      General: Abdomen is flat. There is no distension. Palpations: There is no mass. Tenderness: There is no abdominal tenderness. There is left CVA tenderness. There is no right CVA tenderness, guarding or rebound. Hernia: No hernia is present. Musculoskeletal:         General: No tenderness. Normal range of motion. Skin:     General: Skin is warm. Capillary Refill: Capillary refill takes less than 2 seconds. Neurological:      General: No focal deficit present. Mental Status: She is alert and oriented to person, place, and time. Psychiatric:         Mood and Affect: Mood normal.         DIAGNOSTIC RESULTS     EKG: All EKG's are interpreted by the Emergency Department Physician who either signs orCo-signs this chart in the absence of a cardiologist.      RADIOLOGY:   Non-plainfilm images such as CT, Ultrasound and MRI are read by the radiologist. Plain radiographic images are visualized and preliminarily interpreted by the emergency physician with the below findings:      Interpretationper the Radiologist below, if available at the time of this note:    CT ABDOMEN PELVIS WO CONTRAST Additional Contrast? None   Final Result   No renal stones. There has been significant increase in distension of the left-sided   extrarenal pelvis with the point of obstruction at the level of ureteropelvic   junction. Although the appearance is most likely related to ureteropelvic   junction obstruction, an underlying obstructing lesion cannot be excluded as   findings have significantly progressed since prior examination. Previously visualized left adnexal cyst has resolved. Diastasis of the rectus muscle with a wide neck umbilical hernia containing   fat with the defect measuring 3.6 cm. Previously noted fluid within the   hernia has resolved. Latrice Bran       RECOMMENDATIONS:   Unavailable               ED BEDSIDE ULTRASOUND:   Performed by ED Physician - none    LABS:  Labs Reviewed   URINALYSIS WITH MICROSCOPIC - Abnormal; Notable for the following components:       Result Value    Turbidity UA Cloudy (*)     Specific Gravity, UA >1.030 (*)     Urine Hgb 3+ (*)     Protein, UA 2+ (*)     Nitrite, Urine POSITIVE (*)     Leukocyte Esterase, Urine SMALL (*)     Bacteria, UA 2+ (*)     All other components within normal limits   CBC WITH AUTO DIFFERENTIAL - Abnormal; Notable for the following components:    WBC 11.9 (*)     Seg Neutrophils 70 (*)     Lymphocytes 22 (*)     Segs Absolute 8.33 (*)     All other components within normal limits   COMPREHENSIVE METABOLIC PANEL W/ REFLEX TO MG FOR LOW K - Abnormal; Notable for the following components:    Alkaline Phosphatase 107 (*)     Total Bilirubin 0.26 (*)     All other components within normal limits   PREGNANCY, URINE       All other labs were within normal range or not returned as of this dictation. EMERGENCY DEPARTMENT COURSE and DIFFERENTIAL DIAGNOSIS/MDM:   Vitals:    Vitals:    12/21/21 2029 12/21/21 2030 12/21/21 2031 12/21/21 2032   BP:       Pulse:       Resp:       Temp:       TempSrc:       SpO2: 97% 96% 97% 98%         MDM  Number of Diagnoses or Management Options  Diagnosis management comments: Presented here with a chief complaint of left flank pain. Urinalysis is positive for UTI with nitrites and W BCs. Patient had a CT scan today which shows a concerning area of stricture at the UPJ ureteropelvic junction. I spoke to Dr. Becky To, urology concerning this patient's care. He agrees patient be discharged home with pain medicine and antibiotics. Encouraged to follow-up with him. Patient verbalized understanding agrees with plan of care. Patient also had a previous CT scan in October that showed an ovarian cyst.  She is to have an ultrasound performed follow-up wise with that as well. I discussed this results with patient. Advised her to follow-up with Dr. Karsten Shelton for repeat ultrasound of this as well. Patient agrees with plan of care she feels much better at this point discharged home with diagnosis of UPJ obstruction and UTI  Obstruction of the the ureter pelvis. Procedures    FINAL IMPRESSION      1. Acquired hydronephrosis with ureteropelvic junction (UPJ) obstruction    2. Acute cystitis with hematuria        DISPOSITION/PLAN   DISPOSITION        PATIENT REFERRED TO:  Malia Dow MD  [address]  [phone]    In 1 day      Olivia Henry MD  [facility] [address]   [phone]            DISCHARGE MEDICATIONS:  New Prescriptions    CEPHALEXIN (KEFLEX) 500 MG CAPSULE    Take 1 capsule by mouth 2 times daily for 10 days    ONDANSETRON (ZOFRAN) 4 MG TABLET    Take 1 tablet by mouth every 8 hours as needed for Nausea or Vomiting    TAMSULOSIN (FLOMAX) 0.4 MG CAPSULE    Take 1 capsule by mouth daily for 3 days              Summation      Patient Course:      ED Medications administered this visit:    Medications   hydrocodone-acetaminophen (NORCO) tablet 5-325 mg (STARTER PACK) (has no administration in time range)   0.9 % sodium chloride bolus (0 mLs IntraVENous Stopped 12/21/21 2032)   ondansetron (ZOFRAN) injection 4 mg (4 mg IntraVENous Given 12/21/21 1853)   ketorolac (TORADOL) injection 30 mg (30 mg IntraVENous Given 12/21/21 1854)   cefTRIAXone (ROCEPHIN) 1000 mg IVPB in 50 mL D5W minibag (1,000 mg IntraVENous New Bag 12/21/21 2041)       New Prescriptions from this visit:    New Prescriptions    CEPHALEXIN (KEFLEX) 500 MG CAPSULE    Take 1 capsule by mouth 2 times daily for 10 days    ONDANSETRON (ZOFRAN) 4 MG TABLET    Take 1 tablet by mouth every 8 hours as needed for Nausea or Vomiting    TAMSULOSIN (FLOMAX) 0.4 MG CAPSULE    Take 1 capsule by mouth daily for 3 days       Follow-up:  Malia Dow MD  [address]  [place] [address]    In 1 day      MD Vin [facility] [phone]              Final Impression:   1. Acquired hydronephrosis with ureteropelvic junction (UPJ) obstruction    2.  Acute cystitis with hematuria               (Please note that portions of this note were completed with a voice recognition program.  Efforts were made to edit the dictations but occasionally words are mis-transcribed.)           Pearl Encinas PA-C  12/21/21 2117       Pearl Encinas PA-C  12/21/21 2120

## 2022-01-07 ENCOUNTER — HOSPITAL ENCOUNTER (OUTPATIENT)
Age: 32
Setting detail: SPECIMEN
Discharge: HOME | End: 2022-01-07

## 2022-01-07 ENCOUNTER — OFFICE VISIT (OUTPATIENT)
Dept: OBGYN | Age: 32
End: 2022-01-07
Payer: COMMERCIAL

## 2022-01-07 ENCOUNTER — HOSPITAL ENCOUNTER (OUTPATIENT)
Age: 32
Setting detail: SPECIMEN
Discharge: HOME | End: 2022-01-07
Payer: COMMERCIAL

## 2022-01-07 VITALS — HEIGHT: 68 IN | WEIGHT: 293 LBS | BODY MASS INDEX: 44.41 KG/M2

## 2022-01-07 DIAGNOSIS — Z01.419 WOMEN'S ANNUAL ROUTINE GYNECOLOGICAL EXAMINATION: Primary | ICD-10-CM

## 2022-01-07 DIAGNOSIS — Z01.419 WOMEN'S ANNUAL ROUTINE GYNECOLOGICAL EXAMINATION: ICD-10-CM

## 2022-01-07 PROCEDURE — G0145 SCR C/V CYTO,THINLAYER,RESCR: HCPCS

## 2022-01-07 PROCEDURE — 99395 PREV VISIT EST AGE 18-39: CPT | Performed by: OBSTETRICS & GYNECOLOGY

## 2022-01-07 RX ORDER — TRAZODONE HYDROCHLORIDE 50 MG/1
TABLET ORAL
COMMUNITY
Start: 2021-12-30

## 2022-01-07 RX ORDER — FLUOXETINE HYDROCHLORIDE 40 MG/1
CAPSULE ORAL
COMMUNITY
Start: 2021-10-21

## 2022-01-07 RX ORDER — ATORVASTATIN CALCIUM 20 MG/1
TABLET, FILM COATED ORAL
COMMUNITY
Start: 2021-12-30

## 2022-01-07 RX ORDER — ACETAMINOPHEN AND CODEINE PHOSPHATE 120; 12 MG/5ML; MG/5ML
SOLUTION ORAL
COMMUNITY
Start: 2021-12-30

## 2022-01-07 NOTE — PROGRESS NOTES
PROBLEM VISIT     Date of service: 2022    Erlinda Dixon  Is a 27 y.o.  female    PT's PCP is: Anette Maher, TANNA - CNP     : 1990                                             Subjective:       No LMP recorded. (Menstrual status: Irregular periods). OB History    Para Term  AB Living   0 0 0 0 0 0   SAB IAB Ectopic Molar Multiple Live Births   0 0 0   0          Social History     Tobacco Use   Smoking Status Former Smoker    Types: Cigarettes    Quit date: 2014    Years since quittin.3   Smokeless Tobacco Never Used        Social History     Substance and Sexual Activity   Alcohol Use Yes    Comment: rare       Social History     Substance and Sexual Activity   Sexual Activity Yes    Partners: Male    Birth control/protection: Pill       Allergies: Penicillins and Tape [adhesive tape]    Chief Complaint   Patient presents with    Follow-up     pt presents for ED f/u for ovarian cyst - the pain has resolved       Last Yearly:  18    Last pap: 18    Last HPV: never      NURSE: roxanne  PE:  Vital Signs  Height 5' 8\" (1.727 m), weight (!) 419 lb (190.1 kg), not currently breastfeeding. Labs:    No results found for this visit on 22. HPI: The patient is here today for a yearly exam.  She was seen in the emergency room for acute pain but this pain has entirely gone shortly after her ER visit. NoPT denies fever, chills, nausea and vomiting       Objective  Lymphatic:   no lymphadenopathy  Heent:   negative   Cor: regular rate and rhythm, no murmurs              Pul:clear to auscultation bilaterally- no wheezes, rales or rhonchi, normal air movement, no respiratory distress      GI: Abdomen soft, non-tender. BS normal. No masses,  No organomegaly, morbid obesity noted.   The patient has a very large umbilical hernia           Extremities: normal strength, tone, and muscle mass   Breasts: Breast: Not examined today   Pelvic Exam: GENITAL/URINARY:  External Genitalia:  General appearance; normal, Hair distribution; normal, Lesions absent  Vagina:  General appearance normal, Estrogen effect normal, Discharge absent, Lesions absent, Pelvic support normal  Cervix:  General appearance normal, Lesions absent, Discharge absent, Tenderness absent, Enlargement absent, Nodularity absent  Uterus:  Size normal, Contour normal, Position normal, Masses absent, Consistency; normal, Support normal, Tenderness absent  Adenexa: Masses absent, Tenderness absent, Enlargement absent, Nodularity absent  Anus/Perineum:  Abnormal findings: 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 Vaginal discharge: no vaginal discharge      Uterus: normal size, anteverted, mobile, non-tender, normal shape and consistency     Ovaries: Nonenlarged nontender           Over 50% of time spent on counseling and care coordination on: see assessment and plan                        Assessment and Plan: Will await Pap smear results no further treatment as abdominal pain has resolved. The patient also is starting the process for bariatric surgery as well        Diagnosis Orders   1. Women's annual routine gynecological examination  PAP SMEAR       I am having Yoselin Razo \"Yissel\" maintain her melatonin, tamsulosin, norethindrone, atorvastatin, FLUoxetine, and traZODone.

## 2022-01-17 LAB — CYTOLOGY REPORT: NORMAL

## 2022-11-02 ENCOUNTER — HOSPITAL ENCOUNTER (OUTPATIENT)
Age: 32
Setting detail: SPECIMEN
Discharge: HOME | End: 2022-11-02

## 2022-11-02 LAB
ABSOLUTE EOS #: 0.16 K/UL (ref 0–0.44)
ABSOLUTE IMMATURE GRANULOCYTE: <0.03 K/UL (ref 0–0.3)
ABSOLUTE LYMPH #: 2.22 K/UL (ref 1.1–3.7)
ABSOLUTE MONO #: 0.35 K/UL (ref 0.1–1.2)
BASOPHILS # BLD: 1 % (ref 0–2)
BASOPHILS ABSOLUTE: 0.06 K/UL (ref 0–0.2)
CHOLESTEROL, FASTING: 192 MG/DL
CHOLESTEROL/HDL RATIO: 5.3
EOSINOPHILS RELATIVE PERCENT: 3 % (ref 1–4)
HCT VFR BLD CALC: 38.1 % (ref 36.3–47.1)
HDLC SERPL-MCNC: 36 MG/DL
HEMOGLOBIN: 11.9 G/DL (ref 11.9–15.1)
IMMATURE GRANULOCYTES: 0 %
LDL CHOLESTEROL: 130 MG/DL (ref 0–130)
LYMPHOCYTES # BLD: 41 % (ref 24–43)
MCH RBC QN AUTO: 28.5 PG (ref 25.2–33.5)
MCHC RBC AUTO-ENTMCNC: 31.2 G/DL (ref 28.4–34.8)
MCV RBC AUTO: 91.4 FL (ref 82.6–102.9)
MONOCYTES # BLD: 6 % (ref 3–12)
NRBC AUTOMATED: 0 PER 100 WBC
PDW BLD-RTO: 13 % (ref 11.8–14.4)
PLATELET # BLD: 292 K/UL (ref 138–453)
PMV BLD AUTO: 11.1 FL (ref 8.1–13.5)
RBC # BLD: 4.17 M/UL (ref 3.95–5.11)
SEG NEUTROPHILS: 49 % (ref 36–65)
SEGMENTED NEUTROPHILS ABSOLUTE COUNT: 2.65 K/UL (ref 1.5–8.1)
TRIGLYCERIDE, FASTING: 129 MG/DL
WBC # BLD: 5.5 K/UL (ref 3.5–11.3)

## 2022-11-03 LAB
ALBUMIN SERPL-MCNC: 4.1 G/DL (ref 3.5–5.2)
ALBUMIN/GLOBULIN RATIO: 1.4 (ref 1–2.5)
ALP BLD-CCNC: 72 U/L (ref 35–104)
ALT SERPL-CCNC: 15 U/L (ref 5–33)
ANION GAP SERPL CALCULATED.3IONS-SCNC: 15 MMOL/L (ref 9–17)
AST SERPL-CCNC: 30 U/L
BILIRUB SERPL-MCNC: 0.3 MG/DL (ref 0.3–1.2)
BUN BLDV-MCNC: 12 MG/DL (ref 6–20)
CALCIUM SERPL-MCNC: 9.4 MG/DL (ref 8.6–10.4)
CHLORIDE BLD-SCNC: 102 MMOL/L (ref 98–107)
CO2: 22 MMOL/L (ref 20–31)
CREAT SERPL-MCNC: 0.7 MG/DL (ref 0.5–0.9)
GFR SERPL CREATININE-BSD FRML MDRD: >60 ML/MIN/1.73M2
GLUCOSE BLD-MCNC: 76 MG/DL (ref 70–99)
POTASSIUM SERPL-SCNC: 4.6 MMOL/L (ref 3.7–5.3)
SODIUM BLD-SCNC: 139 MMOL/L (ref 135–144)
T3 TOTAL: 82 NG/DL (ref 60–181)
T4 TOTAL: 5.9 UG/DL (ref 4.5–10.9)
THYROGLOBULIN AB: 14 IU/ML (ref 0–40)
THYROID PEROXIDASE (TPO) AB: <4 IU/ML (ref 0–25)
TOTAL PROTEIN: 7 G/DL (ref 6.4–8.3)
TSH SERPL DL<=0.05 MIU/L-ACNC: 1.43 UIU/ML (ref 0.3–5)

## 2023-02-01 ENCOUNTER — HOSPITAL ENCOUNTER (OUTPATIENT)
Age: 33
Setting detail: SPECIMEN
Discharge: HOME | End: 2023-02-01

## 2023-02-01 LAB
BILIRUBIN URINE: NEGATIVE
CASTS UA: NORMAL /LPF (ref 0–8)
COLOR: YELLOW
EPITHELIAL CELLS UA: NORMAL /HPF (ref 0–5)
GLUCOSE UR STRIP.AUTO-MCNC: NEGATIVE MG/DL
KETONES UR STRIP.AUTO-MCNC: NEGATIVE MG/DL
LEUKOCYTE ESTERASE UR QL STRIP.AUTO: NEGATIVE
NITRITE UR QL STRIP.AUTO: NEGATIVE
PROT UR STRIP.AUTO-MCNC: 6.5 MG/DL (ref 5–8)
PROT UR STRIP.AUTO-MCNC: ABNORMAL MG/DL
RBC CLUMPS #/AREA URNS AUTO: NORMAL /HPF (ref 0–4)
SPECIFIC GRAVITY UA: 1.03 (ref 1–1.03)
TURBIDITY: CLEAR
URINE HGB: NEGATIVE
UROBILINOGEN, URINE: NORMAL
WBC UA: NORMAL /HPF (ref 0–5)

## 2023-02-07 ENCOUNTER — HOSPITAL ENCOUNTER (OUTPATIENT)
Age: 33
Discharge: HOME | End: 2023-02-09
Payer: COMMERCIAL

## 2023-02-07 ENCOUNTER — HOSPITAL ENCOUNTER (OUTPATIENT)
Dept: GENERAL RADIOLOGY | Age: 33
Discharge: HOME | End: 2023-02-09
Payer: COMMERCIAL

## 2023-02-07 DIAGNOSIS — R33.9 BLADDER RETENTION OF URINE: ICD-10-CM

## 2023-02-07 PROCEDURE — 72100 X-RAY EXAM L-S SPINE 2/3 VWS: CPT

## 2023-02-09 ENCOUNTER — HOSPITAL ENCOUNTER (OUTPATIENT)
Dept: ULTRASOUND IMAGING | Age: 33
Discharge: HOME | End: 2023-02-11
Payer: COMMERCIAL

## 2023-02-09 DIAGNOSIS — R33.9 RETENTION OF URINE, UNSPECIFIED: ICD-10-CM

## 2023-02-09 PROCEDURE — 76775 US EXAM ABDO BACK WALL LIM: CPT | Performed by: NURSE PRACTITIONER

## 2023-02-14 ENCOUNTER — TELEPHONE (OUTPATIENT)
Dept: UROLOGY | Age: 33
End: 2023-02-14

## 2023-03-06 ENCOUNTER — OFFICE VISIT (OUTPATIENT)
Dept: UROLOGY | Age: 33
End: 2023-03-06
Payer: COMMERCIAL

## 2023-03-06 ENCOUNTER — HOSPITAL ENCOUNTER (OUTPATIENT)
Age: 33
Setting detail: SPECIMEN
Discharge: HOME | End: 2023-03-06
Payer: COMMERCIAL

## 2023-03-06 VITALS — HEIGHT: 68 IN | BODY MASS INDEX: 44.41 KG/M2 | WEIGHT: 293 LBS

## 2023-03-06 DIAGNOSIS — N32.81 OAB (OVERACTIVE BLADDER): Primary | ICD-10-CM

## 2023-03-06 DIAGNOSIS — N32.81 OAB (OVERACTIVE BLADDER): ICD-10-CM

## 2023-03-06 LAB
BACTERIA: ABNORMAL
BILIRUBIN URINE: NEGATIVE
COLOR: YELLOW
EPITHELIAL CELLS UA: ABNORMAL /HPF (ref 0–25)
GLUCOSE UR STRIP.AUTO-MCNC: NEGATIVE MG/DL
KETONES UR STRIP.AUTO-MCNC: NEGATIVE MG/DL
LEUKOCYTE ESTERASE UR QL STRIP.AUTO: NEGATIVE
NITRITE UR QL STRIP.AUTO: NEGATIVE
PROT UR STRIP.AUTO-MCNC: 8 MG/DL (ref 5–9)
PROT UR STRIP.AUTO-MCNC: NEGATIVE MG/DL
RBC CLUMPS #/AREA URNS AUTO: ABNORMAL /HPF (ref 0–2)
SPECIFIC GRAVITY UA: 1.01 (ref 1.01–1.02)
TURBIDITY: CLEAR
URINE HGB: NEGATIVE
UROBILINOGEN, URINE: NORMAL
WBC UA: ABNORMAL /HPF (ref 0–5)

## 2023-03-06 PROCEDURE — 81001 URINALYSIS AUTO W/SCOPE: CPT

## 2023-03-06 PROCEDURE — 99204 OFFICE O/P NEW MOD 45 MIN: CPT | Performed by: UROLOGY

## 2023-03-06 PROCEDURE — 87086 URINE CULTURE/COLONY COUNT: CPT

## 2023-03-06 PROCEDURE — 51798 US URINE CAPACITY MEASURE: CPT | Performed by: UROLOGY

## 2023-03-06 RX ORDER — OXYBUTYNIN CHLORIDE 10 MG/1
10 TABLET, EXTENDED RELEASE ORAL DAILY
Qty: 30 TABLET | Refills: 3 | Status: SHIPPED | OUTPATIENT
Start: 2023-03-06

## 2023-03-06 ASSESSMENT — ENCOUNTER SYMPTOMS
APNEA: 0
WHEEZING: 0
VOMITING: 0
ABDOMINAL PAIN: 0
SHORTNESS OF BREATH: 0
CONSTIPATION: 0
COUGH: 0
BACK PAIN: 0
COLOR CHANGE: 0
EYE REDNESS: 0
NAUSEA: 0

## 2023-03-06 NOTE — PROGRESS NOTES
HPI:        Patient is a 34 y.o. female in no acute distress. She is alert and oriented to person, place, and time. Patient is being seen here today as a new patient. Patient does feel that she is having urge incontinence. She is unable to identify that she has to void until it is too late. She has already leaked. She does state that this has been happening over the last 1 year. She been having no recent gross hematuria or dysuria. No flank pain nausea vomiting. Patient has had several urinalysis recently. Pt is not diabetic. Pt has never seen  in the past. Pt has periods every 3 months due to birth control. At this point time she does not have to wear protective undergarments. She has no flank pain nausea vomiting. Patient has no stones. She does have a history of a CT scan showing an extrarenal pelvis. Past Medical History:   Diagnosis Date    Anemia 2005    Depression 2016    GERD (gastroesophageal reflux disease)     Herpes simplex type 2 (HSV-2) infection affecting pregnancy, antepartum     Hyperlipidemia     Hypertension 2016    Hypothyroidism 1995    MVP (mitral valve prolapse)      Past Surgical History:   Procedure Laterality Date    WISDOM TOOTH EXTRACTION       Outpatient Encounter Medications as of 3/6/2023   Medication Sig Dispense Refill    norethindrone (MICRONOR) 0.35 MG tablet       atorvastatin (LIPITOR) 20 MG tablet TAKE 1 TABLET BY MOUTH ONCE DAILY      FLUoxetine (PROZAC) 40 MG capsule       traZODone (DESYREL) 50 MG tablet TAKE 1 TABLET BY MOUTH ONCE DAILY 1 HOUR BEFORE BEDTIME      tamsulosin (FLOMAX) 0.4 MG capsule Take 1 capsule by mouth daily for 3 days (Patient not taking: Reported on 1/7/2022) 3 capsule 0    melatonin 3 MG TABS tablet Take 10 mg by mouth nightly as needed (Patient not taking: Reported on 1/7/2022)       No facility-administered encounter medications on file as of 3/6/2023.       Current Outpatient Medications on File Prior to Visit   Medication Sig Dispense Refill    norethindrone (MICRONOR) 0.35 MG tablet       atorvastatin (LIPITOR) 20 MG tablet TAKE 1 TABLET BY MOUTH ONCE DAILY      FLUoxetine (PROZAC) 40 MG capsule       traZODone (DESYREL) 50 MG tablet TAKE 1 TABLET BY MOUTH ONCE DAILY 1 HOUR BEFORE BEDTIME      tamsulosin (FLOMAX) 0.4 MG capsule Take 1 capsule by mouth daily for 3 days (Patient not taking: Reported on 2022) 3 capsule 0    melatonin 3 MG TABS tablet Take 10 mg by mouth nightly as needed (Patient not taking: Reported on 2022)       No current facility-administered medications on file prior to visit. Penicillins and Tape [employer] Couch tape]  Family History   Problem Relation Age of Onset    High Blood Pressure Father     Heart Disease Father     Emphysema Father     COPD Father     Elevated Lipids Father     Atrial Fibrillation Father     Diabetes Maternal Grandfather     Heart Disease Paternal Grandfather     Diabetes Paternal Grandfather     High Blood Pressure Paternal Grandfather     Emphysema Paternal Grandfather     COPD Paternal Grandfather     High Blood Pressure Brother     Heart Disease Paternal Grandmother     Thyroid Disease Mother      Social History     Tobacco Use   Smoking Status Former    Types: Cigarettes    Quit date: 2014    Years since quittin.4   Smokeless Tobacco Never       Social History     Substance and Sexual Activity   Alcohol Use Yes    Comment: rare       Review of Systems   Constitutional:  Negative for appetite change, chills and fever. Eyes:  Negative for redness and visual disturbance. Respiratory:  Negative for apnea, cough, shortness of breath and wheezing. Cardiovascular:  Negative for chest pain and leg swelling. Gastrointestinal:  Negative for abdominal pain, constipation, nausea and vomiting. Genitourinary:  Positive for frequency and urgency.  Negative for difficulty urinating, dyspareunia, dysuria, enuresis, flank pain, hematuria, pelvic pain, vaginal bleeding and vaginal discharge. Musculoskeletal:  Negative for back pain, joint swelling and myalgias. Skin:  Negative for color change, rash and wound. Neurological:  Negative for dizziness, tremors and numbness. Hematological:  Negative for adenopathy. Does not bruise/bleed easily. Psychiatric/Behavioral:  Negative for sleep disturbance. Ht 5' 8\" (1.727 m)   Wt (!) 433 lb (196.4 kg)   BMI 65.84 kg/m²       PHYSICAL EXAM:  Constitutional: Patient resting comfortably, in no acute distress. Neuro: Alert and oriented to person place and time. Cranial nerves grossly intact. Psych: Mood and affect normal.  Skin: Warm, dry  HEENT: normocephalic, atraumatic  Lymphatics: No palpable lymphadenopathy  Lungs: Respiratory effort normal, unlabored  Cardiovascular:  Normal peripheral pulses  Abdomen: Soft, non-tender, non-distended with no organomegaly or palpable masses. : No CVA tenderness. Bladder non-tender and not distended. Pelvic: Deferred    Lab Results   Component Value Date    BUN 12 11/02/2022     Lab Results   Component Value Date    CREATININE 0.70 11/02/2022       ASSESSMENT:  This is a 34 y.o. female with the following diagnoses:   Diagnosis Orders   1. OAB (overactive bladder)  oxybutynin (DITROPAN XL) 10 MG extended release tablet    Urinalysis with Microscopic    Culture, Urine             PLAN:  We will start her on Ditropan XL 10 mg. She will work on improving her diet. She will follow-up with us in 6 weeks. We will check her urine for culture and sensitivity today.

## 2023-03-07 LAB
MICROORGANISM SPEC CULT: NO GROWTH
SPECIMEN DESCRIPTION: NORMAL

## 2023-03-08 ENCOUNTER — TELEPHONE (OUTPATIENT)
Dept: UROLOGY | Age: 33
End: 2023-03-08

## 2023-03-08 NOTE — TELEPHONE ENCOUNTER
----- [facility] sent at 3/8/2023  8:22 AM EST -----  Please call pt - urine culture reviewed and does not show UTI

## 2023-04-18 ENCOUNTER — OFFICE VISIT (OUTPATIENT)
Dept: UROLOGY | Age: 33
End: 2023-04-18
Payer: COMMERCIAL

## 2023-04-18 VITALS
SYSTOLIC BLOOD PRESSURE: 130 MMHG | TEMPERATURE: 97.5 F | HEIGHT: 68 IN | DIASTOLIC BLOOD PRESSURE: 81 MMHG | WEIGHT: 293 LBS | HEART RATE: 61 BPM | BODY MASS INDEX: 44.41 KG/M2

## 2023-04-18 DIAGNOSIS — N32.81 OAB (OVERACTIVE BLADDER): Primary | ICD-10-CM

## 2023-04-18 DIAGNOSIS — E66.01 MORBID OBESITY WITH BMI OF 60.0-69.9, ADULT (HCC): ICD-10-CM

## 2023-04-18 DIAGNOSIS — N39.41 URGE INCONTINENCE: ICD-10-CM

## 2023-04-18 PROCEDURE — 3075F SYST BP GE 130 - 139MM HG: CPT | Performed by: PHYSICIAN ASSISTANT

## 2023-04-18 PROCEDURE — 99214 OFFICE O/P EST MOD 30 MIN: CPT | Performed by: PHYSICIAN ASSISTANT

## 2023-04-18 PROCEDURE — 3079F DIAST BP 80-89 MM HG: CPT | Performed by: PHYSICIAN ASSISTANT

## 2023-04-18 RX ORDER — OXYBUTYNIN CHLORIDE 15 MG/1
15 TABLET, EXTENDED RELEASE ORAL DAILY
Qty: 30 TABLET | Refills: 3 | Status: SHIPPED | OUTPATIENT
Start: 2023-04-18

## 2023-04-18 ASSESSMENT — ENCOUNTER SYMPTOMS
ABDOMINAL PAIN: 0
SHORTNESS OF BREATH: 0
EYE REDNESS: 0
NAUSEA: 0
WHEEZING: 0
CONSTIPATION: 0
BACK PAIN: 0
APNEA: 0
COLOR CHANGE: 0
COUGH: 0
VOMITING: 0

## 2023-04-18 NOTE — PATIENT INSTRUCTIONS
SURVEY:    You may be receiving a survey from [employer] regarding your visit today. Please complete the survey to enable us to provide the highest quality of care to you and your family. If you cannot score us a very good on any question, please call the office to discuss how we could have made your experience a very good one. Thank you.

## 2023-04-18 NOTE — PROGRESS NOTES
Quit date: 2014    Years since quittin.5   Smokeless Tobacco Never       Social History     Substance and Sexual Activity   Alcohol Use Yes    Comment: rare       Review of Systems   Constitutional:  Negative for appetite change, chills and fever. Eyes:  Negative for redness and visual disturbance. Respiratory:  Negative for apnea, cough, shortness of breath and wheezing. Cardiovascular:  Negative for chest pain and leg swelling. Gastrointestinal:  Negative for abdominal pain, constipation, nausea and vomiting. Genitourinary:  Positive for urgency. Negative for difficulty urinating, dyspareunia, dysuria, enuresis, flank pain, frequency, hematuria, pelvic pain, vaginal bleeding and vaginal discharge. Positive for urgency incontinence   Musculoskeletal:  Negative for back pain, joint swelling and myalgias. Skin:  Negative for color change, rash and wound. Neurological:  Negative for dizziness, tremors and numbness. Hematological:  Negative for adenopathy. Does not bruise/bleed easily. Psychiatric/Behavioral:  Negative for sleep disturbance. /81 (Site: Right Lower Arm, Position: Sitting, Cuff Size: Large Adult)   Pulse 61   Temp 97.5 °F (36.4 °C)   Ht 5' 8\" (1.727 m)   Wt (!) 438 lb 12.8 oz (199 kg)   BMI 66.72 kg/m²     PHYSICAL EXAM:  Constitutional: Patient resting comfortably, in no acute distress. Neuro: Alert and oriented to person place and time. Psych: Mood and affect normal.  HEENT: normocephalic, atraumatic  Lungs: Respiratory effort normal, unlabored  Abdomen: Soft, non-tender, non-distended   : No CVA tenderness. Pelvic: deferred     Lab Results   Component Value Date    BUN 12 2022     Lab Results   Component Value Date    CREATININE 0.70 2022       ASSESSMENT:   Diagnosis Orders   1. OAB (overactive bladder)  oxybutynin (DITROPAN XL) 15 MG extended release tablet      2. Urge incontinence        3.  Morbid obesity with BMI of

## 2023-08-13 DIAGNOSIS — N32.81 OAB (OVERACTIVE BLADDER): ICD-10-CM

## 2023-08-14 RX ORDER — OXYBUTYNIN CHLORIDE 15 MG/1
TABLET, EXTENDED RELEASE ORAL
Qty: 30 TABLET | Refills: 3 | Status: SHIPPED | OUTPATIENT
Start: 2023-08-14

## 2023-11-07 ENCOUNTER — HOSPITAL ENCOUNTER (OUTPATIENT)
Dept: GENERAL RADIOLOGY | Age: 33
Discharge: HOME | End: 2023-11-09
Payer: COMMERCIAL

## 2023-11-07 ENCOUNTER — HOSPITAL ENCOUNTER (OUTPATIENT)
Age: 33
Discharge: HOME | End: 2023-11-09
Payer: COMMERCIAL

## 2023-11-07 DIAGNOSIS — S93.692A OTHER SPRAIN OF LEFT FOOT, INITIAL ENCOUNTER: ICD-10-CM

## 2023-11-07 PROCEDURE — 73630 X-RAY EXAM OF FOOT: CPT

## 2023-11-14 ENCOUNTER — OFFICE VISIT (OUTPATIENT)
Dept: UROLOGY | Age: 33
End: 2023-11-14
Payer: COMMERCIAL

## 2023-11-14 VITALS
DIASTOLIC BLOOD PRESSURE: 88 MMHG | BODY MASS INDEX: 67.81 KG/M2 | HEART RATE: 65 BPM | WEIGHT: 293 LBS | SYSTOLIC BLOOD PRESSURE: 165 MMHG | TEMPERATURE: 97.7 F

## 2023-11-14 DIAGNOSIS — N39.41 URGE INCONTINENCE: ICD-10-CM

## 2023-11-14 DIAGNOSIS — N32.81 OAB (OVERACTIVE BLADDER): Primary | ICD-10-CM

## 2023-11-14 PROCEDURE — 99213 OFFICE O/P EST LOW 20 MIN: CPT | Performed by: PHYSICIAN ASSISTANT

## 2023-11-14 PROCEDURE — 51798 US URINE CAPACITY MEASURE: CPT | Performed by: PHYSICIAN ASSISTANT

## 2023-11-14 PROCEDURE — 3077F SYST BP >= 140 MM HG: CPT | Performed by: PHYSICIAN ASSISTANT

## 2023-11-14 PROCEDURE — 3079F DIAST BP 80-89 MM HG: CPT | Performed by: PHYSICIAN ASSISTANT

## 2023-11-14 RX ORDER — IBUPROFEN 800 MG/1
TABLET ORAL
COMMUNITY
Start: 2023-11-07

## 2023-11-14 RX ORDER — OXYBUTYNIN CHLORIDE 15 MG/1
15 TABLET, EXTENDED RELEASE ORAL DAILY
Qty: 90 TABLET | Refills: 3 | Status: SHIPPED | OUTPATIENT
Start: 2023-11-14

## 2023-11-14 ASSESSMENT — ENCOUNTER SYMPTOMS
NAUSEA: 0
COLOR CHANGE: 0
APNEA: 0
COUGH: 0
EYE REDNESS: 0
ABDOMINAL PAIN: 0
VOMITING: 0
CONSTIPATION: 0
SHORTNESS OF BREATH: 0
BACK PAIN: 0
WHEEZING: 0

## 2024-06-11 ENCOUNTER — TELEPHONE (OUTPATIENT)
Dept: UROLOGY | Age: 34
End: 2024-06-11

## 2024-06-11 NOTE — TELEPHONE ENCOUNTER
The program has identified one of your patients whose drug list should be evaluated for discontinuation of nonessential medications and/or potential interactions.      WRITER LOOKED AND PATIENT IS ON ALL THESE MEDICATIONS. LAST SEEN ON 11/14/23.

## 2024-09-10 ENCOUNTER — OFFICE VISIT (OUTPATIENT)
Dept: OBGYN | Age: 34
End: 2024-09-10
Payer: COMMERCIAL

## 2024-09-10 VITALS
HEIGHT: 68 IN | DIASTOLIC BLOOD PRESSURE: 78 MMHG | WEIGHT: 293 LBS | SYSTOLIC BLOOD PRESSURE: 128 MMHG | BODY MASS INDEX: 44.41 KG/M2

## 2024-09-10 DIAGNOSIS — N92.0 MENORRHAGIA WITH REGULAR CYCLE: Primary | ICD-10-CM

## 2024-09-10 PROCEDURE — 3074F SYST BP LT 130 MM HG: CPT | Performed by: OBSTETRICS & GYNECOLOGY

## 2024-09-10 PROCEDURE — 99213 OFFICE O/P EST LOW 20 MIN: CPT | Performed by: OBSTETRICS & GYNECOLOGY

## 2024-09-10 PROCEDURE — 3078F DIAST BP <80 MM HG: CPT | Performed by: OBSTETRICS & GYNECOLOGY

## 2024-09-10 RX ORDER — LISINOPRIL 5 MG/1
5 TABLET ORAL DAILY
COMMUNITY
Start: 2024-08-19

## 2024-09-10 RX ORDER — ACETAMINOPHEN AND CODEINE PHOSPHATE 120; 12 MG/5ML; MG/5ML
1 SOLUTION ORAL DAILY
Qty: 28 TABLET | Refills: 3 | Status: SHIPPED | OUTPATIENT
Start: 2024-09-10 | End: 2024-10-08 | Stop reason: SDUPTHER

## 2024-09-10 NOTE — PROGRESS NOTES
DATE OF VISIT:  9/10/24    PATIENT NAME:  Yoselin Razo     YOB: 1990    REASON FOR VISIT:    Chief Complaint   Patient presents with    Menstrual Problem     Patient states that she has been on OCP for most of her life due to heavy periods.  She states that she has had 2 blood transfusions in the past due to blood loss with her cycle.  She states that she is not planning to have children at this time and would like to discuss a hysterectomy to prevent any further problems with her cycles.           HISTORY OF PRESENT ILLNESS:  Pt with long history of heavy cycles; she has been on OCPs most of her life to attempt to control symptoms; has been anemic to the point of needing blood transfusions in the past; denies recent usn to evaluate endometrial thickening or assess for fibroids; disc'd getting usn and going from there; has decided that she doesn't want children; hasn't seen gynecologist for 2 years        Patient's last menstrual period was 08/18/2024.  Vitals:    09/10/24 1436   BP: 128/78   Weight: (!) 207.7 kg (458 lb)   Height: 1.727 m (5' 8\")     Body mass index is 69.64 kg/m².  Allergies   Allergen Reactions    Penicillins Hives    Tape [Adhesive Tape] Rash     Current Outpatient Medications   Medication Sig Dispense Refill    lisinopril (PRINIVIL;ZESTRIL) 5 MG tablet Take 1 tablet by mouth daily      norethindrone (MICRONOR) 0.35 MG tablet Take 1 tablet by mouth daily 28 tablet 3    oxybutynin (DITROPAN XL) 15 MG extended release tablet Take 1 tablet by mouth daily 90 tablet 3    DOCUSATE SODIUM PO Take by mouth nightly One tablet before Bedtime      Calcium-Vitamin D 500-3.125 MG-MCG TABS Take by mouth      Prenatal Vit-DSS-Fe Cbn-FA (PRENATAL AD PO) Take by mouth daily      FLUoxetine (PROZAC) 40 MG capsule daily      traZODone (DESYREL) 50 MG tablet Take 2 tablets by mouth nightly      ibuprofen (ADVIL;MOTRIN) 800 MG tablet TAKE 1 TABLET BY MOUTH EVERY 8 HOURS AS NEEDED FOR PAIN AND

## 2024-10-08 ENCOUNTER — OFFICE VISIT (OUTPATIENT)
Dept: OBGYN | Age: 34
End: 2024-10-08
Payer: COMMERCIAL

## 2024-10-08 DIAGNOSIS — N93.9 ABNORMAL UTERINE BLEEDING (AUB): Primary | ICD-10-CM

## 2024-10-08 PROCEDURE — 99213 OFFICE O/P EST LOW 20 MIN: CPT | Performed by: OBSTETRICS & GYNECOLOGY

## 2024-10-08 RX ORDER — ACETAMINOPHEN AND CODEINE PHOSPHATE 120; 12 MG/5ML; MG/5ML
1 SOLUTION ORAL DAILY
Qty: 28 TABLET | Refills: 3 | Status: SHIPPED | OUTPATIENT
Start: 2024-10-08

## 2024-10-08 NOTE — PROGRESS NOTES
DATE OF VISIT:  10/8/24    PATIENT NAME:  Yoselin Razo     YOB: 1990    REASON FOR VISIT:    Chief Complaint   Patient presents with    Procedure     Patient is being seen for endo biopsy.      Follow-up        HISTORY OF PRESENT ILLNESS:  We had planned endo bx but because lining is thin and she is responding to makenzie, we disc'd options other than ocp for cycle control; disc'd that with bmi surgery would need to occur at tertiary care center; disc'd option of continuing ocp as she has done well with it v iud; pt interested in iud        Patient's last menstrual period was 08/18/2024.  There were no vitals filed for this visit.  There is no height or weight on file to calculate BMI.  Allergies   Allergen Reactions    Penicillins Hives    Tape [Adhesive Tape] Rash     Current Outpatient Medications   Medication Sig Dispense Refill    norethindrone (MICRONOR) 0.35 MG tablet Take 1 tablet by mouth daily 28 tablet 3    lisinopril (PRINIVIL;ZESTRIL) 5 MG tablet Take 1 tablet by mouth daily      oxybutynin (DITROPAN XL) 15 MG extended release tablet Take 1 tablet by mouth daily 90 tablet 3    DOCUSATE SODIUM PO Take by mouth nightly One tablet before Bedtime      Calcium-Vitamin D 500-3.125 MG-MCG TABS Take by mouth      Prenatal Vit-DSS-Fe Cbn-FA (PRENATAL AD PO) Take by mouth daily      FLUoxetine (PROZAC) 40 MG capsule daily      traZODone (DESYREL) 50 MG tablet Take 2 tablets by mouth nightly       No current facility-administered medications for this visit.     Social History     Socioeconomic History    Marital status:    Tobacco Use    Smoking status: Former     Current packs/day: 0.00     Average packs/day: 0.3 packs/day for 0.5 years (0.1 ttl pk-yrs)     Types: E-Cigarettes, Cigarettes     Quit date: 9/14/2014     Years since quitting: 10.0    Smokeless tobacco: Never    Tobacco comments:     I picked it back up after my grandmas cancer diagnosis   Vaping Use    Vaping status: Every

## 2024-10-28 ENCOUNTER — TELEPHONE (OUTPATIENT)
Dept: OBGYN CLINIC | Age: 34
End: 2024-10-28

## 2024-10-28 NOTE — TELEPHONE ENCOUNTER
Received a call from [facility] in [place], patient has been taking Micronor and this has been discontinued.  Pharmacy needed verbal approval to change patient to a generic.  Approval given from VO from Dr. Amador.

## 2024-11-14 DIAGNOSIS — N32.81 OAB (OVERACTIVE BLADDER): ICD-10-CM

## 2024-11-14 RX ORDER — OXYBUTYNIN CHLORIDE 15 MG/1
15 TABLET, EXTENDED RELEASE ORAL DAILY
Qty: 90 TABLET | Refills: 0 | Status: SHIPPED | OUTPATIENT
Start: 2024-11-14

## 2024-11-14 NOTE — TELEPHONE ENCOUNTER
Last appt   11/14/2023   Next appt   11/19/2024     Medication is active on current medication list.

## 2024-11-24 ENCOUNTER — HOSPITAL ENCOUNTER (OUTPATIENT)
Dept: SLEEP CENTER | Age: 34
Discharge: HOME | End: 2024-11-24
Payer: COMMERCIAL

## 2024-11-24 VITALS — BODY MASS INDEX: 44.41 KG/M2 | HEIGHT: 68 IN | WEIGHT: 293 LBS

## 2024-11-24 PROCEDURE — 95811 POLYSOM 6/>YRS CPAP 4/> PARM: CPT

## 2024-11-24 ASSESSMENT — SLEEP AND FATIGUE QUESTIONNAIRES
I SLEEP WELL: NO
ESS TOTAL SCORE: 4
HOW LIKELY ARE YOU TO NOD OFF OR FALL ASLEEP WHILE SITTING QUIETLY AFTER LUNCH WITHOUT ALCOHOL: WOULD NEVER DOZE
ARE YOU TIRED DURING WAKE TIME: 1-2 TIMES A WEEK
HOW LIKELY ARE YOU TO NOD OFF OR FALL ASLEEP WHILE SITTING INACTIVE IN A PUBLIC PLACE: WOULD NEVER DOZE
WHAT TIME DO YOU USUALLY GO TO BED: 0
HOW LIKELY ARE YOU TO NOD OFF OR FALL ASLEEP WHILE WATCHING TV: SLIGHT CHANCE OF DOZING
HOW LIKELY ARE YOU TO NOD OFF OR FALL ASLEEP WHILE SITTING AND READING: SLIGHT CHANCE OF DOZING
HOW LIKELY ARE YOU TO NOD OFF OR FALL ASLEEP IN A CAR, WHILE STOPPED FOR A FEW MINUTES IN TRAFFIC: WOULD NEVER DOZE
HOW LIKELY ARE YOU TO NOD OFF OR FALL ASLEEP WHILE SITTING AND TALKING TO SOMEONE: WOULD NEVER DOZE
HOW LIKELY ARE YOU TO NOD OFF OR FALL ASLEEP WHILE LYING DOWN TO REST IN THE AFTERNOON WHEN CIRCUMSTANCES PERMIT: MODERATE CHANCE OF DOZING
FUNCTION BEST IN: EVENING
HOW MANY NAPS DO YOU TAKE PER WEEK: 1
NUMBER OF TIMES YOU WAKE PER NIGHT: 1
HAVE YOU EVER NODDED OFF OR FALLEN ASLEEP WHILE DRIVING: NO
HAS ANYONE NOTICED THAT YOU QUIT BREATHING DURING SLEEP: 1-2 TIMES A WEEK
NORMAL AMOUNT OF SLEEP PER NIGHT: 8
DOES YOUR SNORING BOTHER OTHERS: NO
SNORING VOLUME: AS LOUD AS TALKING
WHAT TIME DO YOU USUALLY WAKE UP: 34200
ARE YOU TIRED AFTER SLEEPING: NEVER/ALMOST NEVER
HOW OFTEN DO YOU SNORE: ALMOST DAILY
FOR THE FIRST 30 MINUTES AFTER WAKING, I AM: SOMEWHAT DROWSY
USUAL AMOUNT OF TIME TO FALL ASLEEP (MIN): 15
DO YOU HAVE HIGH BLOOD PRESSURE: YES
HOW LIKELY ARE YOU TO NOD OFF OR FALL ASLEEP WHEN YOU ARE A PASSENGER IN A CAR FOR AN HOUR WITHOUT A BREAK: WOULD NEVER DOZE
DO YOU SNORE: YES

## 2024-11-25 ENCOUNTER — TELEPHONE (OUTPATIENT)
Dept: OBGYN CLINIC | Age: 34
End: 2024-11-25

## 2024-11-25 NOTE — TELEPHONE ENCOUNTER
Patient called to get an update on the IUD that was discussed at her visit last month.  Are you able to reach out to her?

## 2024-11-26 LAB — STATUS: NORMAL

## 2024-11-26 NOTE — PROGRESS NOTES
Patient arrived for split night sleep testing 11/24/24. Testing explained, all questions answered, pt voices understanding.   Patient qualified to [facility] F30i full face size [facility] for DME

## 2025-02-04 ENCOUNTER — PROCEDURE VISIT (OUTPATIENT)
Dept: OBGYN | Age: 35
End: 2025-02-04
Payer: COMMERCIAL

## 2025-02-04 VITALS
DIASTOLIC BLOOD PRESSURE: 84 MMHG | BODY MASS INDEX: 44.41 KG/M2 | SYSTOLIC BLOOD PRESSURE: 126 MMHG | WEIGHT: 293 LBS | HEIGHT: 68 IN

## 2025-02-04 DIAGNOSIS — Z30.430 ENCOUNTER FOR INSERTION OF MIRENA IUD: ICD-10-CM

## 2025-02-04 DIAGNOSIS — Z01.812 PRE-PROCEDURE LAB EXAM: ICD-10-CM

## 2025-02-04 DIAGNOSIS — N92.0 MENORRHAGIA WITH REGULAR CYCLE: Primary | ICD-10-CM

## 2025-02-04 DIAGNOSIS — N93.9 ABNORMAL UTERINE BLEEDING (AUB): ICD-10-CM

## 2025-02-04 LAB
CONTROL: PRESENT
PREGNANCY TEST URINE, POC: NEGATIVE

## 2025-02-04 PROCEDURE — 81025 URINE PREGNANCY TEST: CPT | Performed by: ADVANCED PRACTICE MIDWIFE

## 2025-02-04 PROCEDURE — 58300 INSERT INTRAUTERINE DEVICE: CPT | Performed by: ADVANCED PRACTICE MIDWIFE

## 2025-02-04 RX ORDER — MIRTAZAPINE 15 MG/1
TABLET, FILM COATED ORAL
COMMUNITY

## 2025-02-04 ASSESSMENT — PATIENT HEALTH QUESTIONNAIRE - PHQ9
SUM OF ALL RESPONSES TO PHQ QUESTIONS 1-9: 0
1. LITTLE INTEREST OR PLEASURE IN DOING THINGS: NOT AT ALL
SUM OF ALL RESPONSES TO PHQ QUESTIONS 1-9: 0
SUM OF ALL RESPONSES TO PHQ QUESTIONS 1-9: 0
2. FEELING DOWN, DEPRESSED OR HOPELESS: NOT AT ALL
SUM OF ALL RESPONSES TO PHQ9 QUESTIONS 1 & 2: 0
SUM OF ALL RESPONSES TO PHQ QUESTIONS 1-9: 0

## 2025-02-04 NOTE — PROGRESS NOTES
The patient is a 34 y.o. female that presents for IUD insertion for menometrorrhagia    OB History          0    Para   0    Term   0       0    AB   0    Living   0         SAB   0    IAB   0    Ectopic   0    Molar        Multiple   0    Live Births                    Allergies: Penicillins and Tape [adhesive tape]    Vitals: Blood pressure 126/84, height 1.727 m (5' 8\"), weight (!) 212.3 kg (468 lb), last menstrual period 2025, not currently breastfeeding.    Last coitus: 1  week(s)    Premedicated with Motrin 800 mg: Yes    Cytotec 200mcg:Yes    UCG: negative    Consent signed:  Yes    PROCEDURE:    Mirena      Bimanual exam: anteverted    Cervix cleansed with: Betadinex3    Tenaculum applied: Yes     Uterus sounded: 9cm    Mirena inserted without difficulty. IUD strings trimmed to 3 cm.     Cervical dilators used     Assessment:  Assessment & Plan  Menorrhagia with regular cycle   Chronic, not at goal (unstable), changes made today:      Orders:    INSERT INTRAUTERINE DEVICE    levonorgestrel (MIRENA) IUD 52 mg 1 each    Abnormal uterine bleeding (AUB)   Chronic, not at goal (unstable), changes made today:      Orders:    INSERT INTRAUTERINE DEVICE    levonorgestrel (MIRENA) IUD 52 mg 1 each    Encounter for insertion of Mirena IUD   Chronic, not at goal (unstable), changes made today:      Orders:    INSERT INTRAUTERINE DEVICE    levonorgestrel (MIRENA) IUD 52 mg 1 each    Pre-procedure lab exam       Orders:    POCT urine pregnancy        Plan:  Education on IUD  Abstain from intercourse for 72 hours  Motrin 800 mg Q 8 hours PRN  RTO one month for ultrasound for sting check.  See MAR for lot # and NDC #    Return in about 4 weeks (around 3/4/2025) for gyn u/s IUD placement.    Nupur Tavera, TANNA - SANDRA,2025 5:04 PM

## 2025-02-15 DIAGNOSIS — N32.81 OAB (OVERACTIVE BLADDER): ICD-10-CM

## 2025-02-17 NOTE — TELEPHONE ENCOUNTER
PATIENT WAS A NO SHOW FOR HER APPOINTMENT IN NOVEMBER 2024.      Last appt   11/14/2023   Next appt   Visit date not found     Medication is active on current medication list.

## 2025-02-19 NOTE — TELEPHONE ENCOUNTER
Attempted to call patient to schedule follow up appt. No answer,LVM asking patient to returns our call.     Last appt   11/14/2023   Next appt   Visit date not found     Medication is active on current medication list.

## 2025-02-26 RX ORDER — OXYBUTYNIN CHLORIDE 15 MG/1
15 TABLET, EXTENDED RELEASE ORAL DAILY
Qty: 90 TABLET | Refills: 0 | OUTPATIENT
Start: 2025-02-26

## 2025-03-04 SDOH — ECONOMIC STABILITY: INCOME INSECURITY: IN THE LAST 12 MONTHS, WAS THERE A TIME WHEN YOU WERE NOT ABLE TO PAY THE MORTGAGE OR RENT ON TIME?: PATIENT DECLINED

## 2025-03-04 SDOH — ECONOMIC STABILITY: FOOD INSECURITY: WITHIN THE PAST 12 MONTHS, YOU WORRIED THAT YOUR FOOD WOULD RUN OUT BEFORE YOU GOT MONEY TO BUY MORE.: PATIENT DECLINED

## 2025-03-04 SDOH — ECONOMIC STABILITY: FOOD INSECURITY: WITHIN THE PAST 12 MONTHS, THE FOOD YOU BOUGHT JUST DIDN'T LAST AND YOU DIDN'T HAVE MONEY TO GET MORE.: PATIENT DECLINED

## 2025-03-04 SDOH — ECONOMIC STABILITY: TRANSPORTATION INSECURITY
IN THE PAST 12 MONTHS, HAS THE LACK OF TRANSPORTATION KEPT YOU FROM MEDICAL APPOINTMENTS OR FROM GETTING MEDICATIONS?: PATIENT DECLINED

## 2025-03-04 SDOH — ECONOMIC STABILITY: TRANSPORTATION INSECURITY
IN THE PAST 12 MONTHS, HAS LACK OF TRANSPORTATION KEPT YOU FROM MEETINGS, WORK, OR FROM GETTING THINGS NEEDED FOR DAILY LIVING?: PATIENT DECLINED

## 2025-03-05 ENCOUNTER — ANCILLARY PROCEDURE (OUTPATIENT)
Dept: OBGYN | Age: 35
End: 2025-03-05

## 2025-03-05 ENCOUNTER — OFFICE VISIT (OUTPATIENT)
Dept: OBGYN | Age: 35
End: 2025-03-05

## 2025-03-05 VITALS
WEIGHT: 293 LBS | BODY MASS INDEX: 44.41 KG/M2 | HEIGHT: 68 IN | DIASTOLIC BLOOD PRESSURE: 82 MMHG | SYSTOLIC BLOOD PRESSURE: 138 MMHG

## 2025-03-05 DIAGNOSIS — N93.9 ABNORMAL UTERINE BLEEDING (AUB): ICD-10-CM

## 2025-03-05 DIAGNOSIS — Z30.431 IUD CHECK UP: ICD-10-CM

## 2025-03-05 DIAGNOSIS — N92.0 MENORRHAGIA WITH REGULAR CYCLE: Primary | ICD-10-CM

## 2025-03-05 PROCEDURE — 99213 OFFICE O/P EST LOW 20 MIN: CPT | Performed by: ADVANCED PRACTICE MIDWIFE

## 2025-03-05 PROCEDURE — 3075F SYST BP GE 130 - 139MM HG: CPT | Performed by: ADVANCED PRACTICE MIDWIFE

## 2025-03-05 PROCEDURE — 76830 TRANSVAGINAL US NON-OB: CPT | Performed by: OBSTETRICS & GYNECOLOGY

## 2025-03-05 PROCEDURE — 3079F DIAST BP 80-89 MM HG: CPT | Performed by: ADVANCED PRACTICE MIDWIFE

## 2025-03-05 RX ORDER — LEVONORGESTREL 52 MG/1
1 INTRAUTERINE DEVICE INTRAUTERINE ONCE
COMMUNITY

## 2025-03-05 NOTE — PROGRESS NOTES
echo pattern- uterus difficult to penetrate      IUD visualized & located in correct placement, strings seen in cervix      ENDO:4mm in thickness      RT. OVARY:not visualized      LT. OVARY: not visualized      Limited exam, due to body habitus & over-lying bowel gas                                 Assessment and Plan         Assessment & Plan  Menorrhagia with regular cycle   Chronic, at goal (stable), continue current treatment plan         Abnormal uterine bleeding (AUB)   Chronic, at goal (stable), continue current treatment plan                 I am having Yoselin Razo \"Yissel\" maintain her FLUoxetine, traZODone, Calcium-Vitamin D, Prenatal Vit-DSS-Fe Cbn-FA (PRENATAL AD PO), DOCUSATE SODIUM PO, lisinopril, oxyBUTYnin, mirtazapine, and Mirena (52 MG).    Return for needs yearly.    She was also counseled on her preventative health maintenance recommendations and follow-up.     There are no Patient Instructions on file for this visit.    TANNA Grant CNM,3/5/2025 4:51 PM